# Patient Record
Sex: FEMALE | Race: WHITE | NOT HISPANIC OR LATINO | Employment: FULL TIME | URBAN - METROPOLITAN AREA
[De-identification: names, ages, dates, MRNs, and addresses within clinical notes are randomized per-mention and may not be internally consistent; named-entity substitution may affect disease eponyms.]

---

## 2017-02-23 ENCOUNTER — OFFICE VISIT (OUTPATIENT)
Dept: DERMATOLOGY | Facility: CLINIC | Age: 52
End: 2017-02-23

## 2017-02-23 DIAGNOSIS — L84 CORN OR CALLUS: ICD-10-CM

## 2017-02-23 DIAGNOSIS — L73.2 HIDRADENITIS SUPPURATIVA: Primary | ICD-10-CM

## 2017-02-23 RX ORDER — MINOCYCLINE HYDROCHLORIDE 100 MG/1
100 TABLET ORAL 2 TIMES DAILY
Qty: 180 TABLET | Refills: 0 | Status: CANCELLED | OUTPATIENT
Start: 2017-02-23

## 2017-02-23 RX ORDER — CLINDAMYCIN PHOSPHATE 10 UG/ML
LOTION TOPICAL
Qty: 60 ML | Refills: 3 | Status: SHIPPED | OUTPATIENT
Start: 2017-02-23 | End: 2017-05-25

## 2017-02-23 RX ORDER — MINOCYCLINE HYDROCHLORIDE 50 MG/1
50 CAPSULE ORAL 2 TIMES DAILY
Qty: 60 CAPSULE | Refills: 2 | Status: SHIPPED | OUTPATIENT
Start: 2017-02-23 | End: 2017-05-25

## 2017-02-23 ASSESSMENT — PAIN SCALES - GENERAL: PAINLEVEL: NO PAIN (0)

## 2017-02-23 NOTE — NURSING NOTE
Dermatology Rooming Note    Gertrude Dyson's goals for this visit include:   Chief Complaint   Patient presents with     Derm Problem     H S follow up, Gertrude states she is doing well today but has medication concerns.     Melinda Dawn LPN

## 2017-02-23 NOTE — PATIENT INSTRUCTIONS
Continue daily use of BP wash to the affected areas  Use clinda lotion twice daily to areas that are red/angry/inflammed.   Continue to attempt smoking cessation.   Sun protection as below.   Decrease dosage of antibiotic (minocycline) to 50 mg twice daily  Follow up in 3 months.

## 2017-02-23 NOTE — PROGRESS NOTES
"[facility] Dermatology Progress Note      Dermatology Problem List:  1. Hidradenitis Suppurativa, mild:  - S/p isotretinoin at outside clinic (elevated Tg and dry eyes). Presently: Minocycline 50 mg BID, BP was QD, Clindamycin lotion BID PRN. Goal of smoking cessation.   2. Hx of \"eczema\"'  3. Corn, right thumb. Paring 2/23/2017.     Encounter Date: Feb 23, 2017    CC:   Chief Complaint   Patient presents with     Derm Problem     H S follow up, Gertrude states she is doing well today but has medication concerns.       History of Present Illness:  Ms. Gertrude Dyson is a 51 year old woman who is seen in f/u for HS. Notably, she has had a great response to the treatment recommendations since last seen 10/13/16. She continues on the minocycline 100mg BID without SE or complications. Much fewer flares. Lesion injected on the left inframammary fold resolved quickly s/p ILK. Occasionally with inflammation in the right groin fold/drainage. 2 lesions slightly more linear/scarred on the left inframmamary fold. Overal, much improved, very pleased. Did not tolerate the drysol 2/2 sever skin irritation/burning. Using BP as prescribed. Hoping to quit smoking soon. Has purchased nicotine patches. Plans to do so in the near future.       Past Medical History:   Hidradenitis  DM2   HARPER  Tobacco use  Hx of opioid abuse  Headaches/Migraines  Glaucoma  Hypertriglyceridema  DJD  GERD  HTN  MD    Social History:  The patient works for [employer]. Worked the night shift prior to the appointment. .    Family History:  There is no family history of skin cancer.    Medications:  Current Outpatient Prescriptions   Medication Sig Dispense Refill     FERROUS GLUCONATE PO Take 324 mg by mouth Pt. Unsure of dose       minocycline (MINOCIN/DYNACIN) 50 MG capsule Take 1 capsule (50 mg) by mouth 2 times daily 60 capsule 2     clindamycin (CLEOCIN T) 1 % lotion Apply twice daily to sites of flaring on the body. 60 mL 3     " minocycline (DYNACIN) 100 MG tablet Take 1 tablet (100 mg) by mouth 2 times daily 180 tablet 0     METFORMIN HCL PO Take 500 mg by mouth 2 times daily (with meals)       sertraline (ZOLOFT) 50 MG tablet Take 50 mg by mouth daily       Pregabalin (LYRICA) 200 MG CAPS Take 1 capsule by mouth 3 times daily.       metoprolol (LOPRESSOR) 100 MG tablet Take 1 tablet by mouth daily.       SUMAtriptan (IMITREX) 50 MG tablet Take 1 tablet by mouth at onset of headache. May take 1 tab every 4 hours prn       methadone (DOLOPHINE) 5 MG tablet Take 1 tablet by mouth 3 times daily. Take about 8 hours apart       hydrochlorothiazide (HYDRODIURIL) 25 MG tablet Take 25 mg by mouth daily.       LANsoprazole (PREVACID SOLUTAB) 30 MG disintegrating tablet Take 30 mg by mouth daily.       zolpidem (AMBIEN) 10 MG tablet Take 10 mg by mouth nightly as needed.       aluminum chloride (DRYSOL) 20 % external solution Apply nightly to sweaty areas (breast, arm pits, groin folds) and wash area in morning. As sweating improves, decrease use to 1-2 times weekly. 60 mL 3     cyclobenzaprine (FLEXERIL) 10 MG tablet Take 1 tablet by mouth nightly as needed.       latanoprost (XALATAN) 0.005 % ophthalmic solution Place 1 drop into both eyes At Bedtime.       hydrOXYzine (VISTARIL) 25 MG capsule Take 1-2 capsules by mouth every 6 hours as needed. For nausea, anxiety, pain       aspirin 81 MG EC tablet Take 81 mg by mouth daily.       lisinopril (PRINIVIL,ZESTRIL) 40 MG tablet Take 40 mg by mouth daily.          No Known Allergies      Review of Systems:  -As per HPI  -Constitutional: The patient denies fatigue, fevers, chills, unintended weight loss, and night sweats.  -HEENT: Patient denies nonhealing oral sores.  -Skin: As above in HPI. No additional skin concerns.    Physical exam:  Vitals: There were no vitals taken for this visit.  GEN: This is a well developed, well-nourished female in no acute distress, in a pleasant mood.    SKIN: Focused  examination of the chest, abdomen, upper extremities, groin, thighs was performed.  -Double comedone in right axilla, small nodule underlying.   -Left inframammary fold with two, light pink, scar like oval papules. No active inflammatory lesions.   -Right inguinal fold with double comedone overlying small cystic nodule. No erythema or drainage.   -Right medial thumb with 2 mm, skin colored, shiny papule without obscuration of dermatoglyphs. No punctate vessels.   -No other lesions of concern on areas examined.     Impression/Plan:  1. Hidradenitis Suppurativa, Glass Stage 1, inframammary, groin and lower abdominal involvement. Isolated double comedone in right axilla. Previously s/p isotretinoin which was not well tolerated. Well controlled presently on Minocycline 100 mg BID as well as BP wash. Ongoing smoking likely contributing. No inflammatory lesions to inject today.   -Decrease Minocycline to 50 mg BID   -Add Clindamycin lotion BID to the affected inflammed lesions PRN.   -Continue daily BP wash to the affected areas  -Stop Drysol given previous irritation.    -Smoking cessation; presently starting nicotine patch.   -Discussed in detail etiology, pathophysiology, associated conditions, time course and treatment options. All questions answered to apparent satisfaction.     2. Callus/corn, right medial thumb. S/p paring today. Well tolerated. Will repeat PRN.     Follow-up in 3 months, earlier for new or changing lesions.      staffed the patient.     Staff Involved:  Resident(Nitin Jacobsen)/Staff(as above)    Nitin Jacobsen MD  PGY2 Dermatology  [phone]   I was present for the entire procedure. KB  .I, Agustina Hauser MD, saw this patient with the resident and agree with the resident s findings and plan of care as documented in the resident s note.

## 2017-02-23 NOTE — LETTER
"2/23/2017       RE: Gertrude Dyson  [address] [address]     Dear Colleague,    Thank you for referring your patient, Gertrude Dyson, to the [facility] DERMATOLOGY at [facility] Please see a copy of my visit note below.    [facility] Dermatology Progress Note      Dermatology Problem List:  1. Hidradenitis Suppurativa, mild:  - S/p isotretinoin at outside clinic (elevated Tg and dry eyes). Presently: Minocycline 50 mg BID, BP was QD, Clindamycin lotion BID PRN. Goal of smoking cessation.   2. Hx of \"eczema\"'  3. Corn, right thumb. Paring 2/23/2017.     Encounter Date: Feb 23, 2017    CC:   Chief Complaint   Patient presents with     Derm Problem     H S follow up, Gertrude states she is doing well today but has medication concerns.       History of Present Illness:  Ms. Gertrude Dyson is a 51 year old woman who is seen in f/u for HS. Notably, she has had a great response to the treatment recommendations since last seen 10/13/16. She continues on the minocycline 100mg BID without SE or complications. Much fewer flares. Lesion injected on the left inframammary fold resolved quickly s/p ILK. Occasionally with inflammation in the right groin fold/drainage. 2 lesions slightly more linear/scarred on the left inframmamary fold. Overal, much improved, very pleased. Did not tolerate the drysol 2/2 sever skin irritation/burning. Using BP as prescribed. Hoping to quit smoking soon. Has purchased nicotine patches. Plans to do so in the near future.       Past Medical History:   Hidradenitis  DM2   HARPER  Tobacco use  Hx of opioid abuse  Headaches/Migraines  Glaucoma  Hypertriglyceridema  DJD  GERD  HTN  MD    Social History:  The patient works for [employer]. Worked the night shift prior to the appointment. .    Family History:  There is no family history of skin cancer.    Medications:  Current Outpatient Prescriptions   Medication Sig Dispense Refill "     FERROUS GLUCONATE PO Take 324 mg by mouth Pt. Unsure of dose       minocycline (MINOCIN/DYNACIN) 50 MG capsule Take 1 capsule (50 mg) by mouth 2 times daily 60 capsule 2     clindamycin (CLEOCIN T) 1 % lotion Apply twice daily to sites of flaring on the body. 60 mL 3     minocycline (DYNACIN) 100 MG tablet Take 1 tablet (100 mg) by mouth 2 times daily 180 tablet 0     METFORMIN HCL PO Take 500 mg by mouth 2 times daily (with meals)       sertraline (ZOLOFT) 50 MG tablet Take 50 mg by mouth daily       Pregabalin (LYRICA) 200 MG CAPS Take 1 capsule by mouth 3 times daily.       metoprolol (LOPRESSOR) 100 MG tablet Take 1 tablet by mouth daily.       SUMAtriptan (IMITREX) 50 MG tablet Take 1 tablet by mouth at onset of headache. May take 1 tab every 4 hours prn       methadone (DOLOPHINE) 5 MG tablet Take 1 tablet by mouth 3 times daily. Take about 8 hours apart       hydrochlorothiazide (HYDRODIURIL) 25 MG tablet Take 25 mg by mouth daily.       LANsoprazole (PREVACID SOLUTAB) 30 MG disintegrating tablet Take 30 mg by mouth daily.       zolpidem (AMBIEN) 10 MG tablet Take 10 mg by mouth nightly as needed.       aluminum chloride (DRYSOL) 20 % external solution Apply nightly to sweaty areas (breast, arm pits, groin folds) and wash area in morning. As sweating improves, decrease use to 1-2 times weekly. 60 mL 3     cyclobenzaprine (FLEXERIL) 10 MG tablet Take 1 tablet by mouth nightly as needed.       latanoprost (XALATAN) 0.005 % ophthalmic solution Place 1 drop into both eyes At Bedtime.       hydrOXYzine (VISTARIL) 25 MG capsule Take 1-2 capsules by mouth every 6 hours as needed. For nausea, anxiety, pain       aspirin 81 MG EC tablet Take 81 mg by mouth daily.       lisinopril (PRINIVIL,ZESTRIL) 40 MG tablet Take 40 mg by mouth daily.          No Known Allergies      Review of Systems:  -As per HPI  -Constitutional: The patient denies fatigue, fevers, chills, unintended weight loss, and night sweats.  -HEENT:  Patient denies nonhealing oral sores.  -Skin: As above in HPI. No additional skin concerns.    Physical exam:  Vitals: There were no vitals taken for this visit.  GEN: This is a well developed, well-nourished female in no acute distress, in a pleasant mood.    SKIN: Focused examination of the chest, abdomen, upper extremities, groin, thighs was performed.  -Double comedone in right axilla, small nodule underlying.   -Left inframammary fold with two, light pink, scar like oval papules. No active inflammatory lesions.   -Right inguinal fold with double comedone overlying small cystic nodule. No erythema or drainage.   -Right medial thumb with 2 mm, skin colored, shiny papule without obscuration of dermatoglyphs. No punctate vessels.   -No other lesions of concern on areas examined.     Impression/Plan:  1. Hidradenitis Suppurativa, Glass Stage 1, inframammary, groin and lower abdominal involvement. Isolated double comedone in right axilla. Previously s/p isotretinoin which was not well tolerated. Well controlled presently on Minocycline 100 mg BID as well as BP wash. Ongoing smoking likely contributing. No inflammatory lesions to inject today.   -Decrease Minocycline to 50 mg BID   -Add Clindamycin lotion BID to the affected inflammed lesions PRN.   -Continue daily BP wash to the affected areas  -Stop Drysol given previous irritation.    -Smoking cessation; presently starting nicotine patch.   -Discussed in detail etiology, pathophysiology, associated conditions, time course and treatment options. All questions answered to apparent satisfaction.     2. Callus/corn, right medial thumb. S/p paring today. Well tolerated. Will repeat PRN.     Follow-up in 3 months, earlier for new or changing lesions.      staffed the patient.     Staff Involved:  Resident(Nitin Jacobsen)/Staff(as above)    Nitin Jacobsen MD  PGY2 Dermatology  [phone]   I was present for the entire procedure. MEJIA MANN, Agustina Hauser  MD, saw this patient with the resident and agree with the resident s findings and plan of care as documented in the resident s note.

## 2017-02-23 NOTE — MR AVS SNAPSHOT
After Visit Summary   2/23/2017    Gertrude Dyson    MRN: [id number]           Patient Information     Date Of Birth          1965        Visit Information        Provider Department      2/23/2017 10:30 AM Nitin Jacobsen MD [facility] Dermatology        Today's Diagnoses     Hidradenitis suppurativa    -  1    Corn or callus          Care Instructions    Continue daily use of BP wash to the affected areas  Use clinda lotion twice daily to areas that are red/angry/inflammed.   Continue to attempt smoking cessation.   Sun protection as below.   Decrease dosage of antibiotic (minocycline) to 50 mg twice daily  Follow up in 3 months.                     Follow-ups after your visit        Follow-up notes from your care team     Return in about 3 months (around 5/23/2017).      Your next 10 appointments already scheduled     May 25, 2017  9:00 AM CDT   (Arrive by 8:45 AM)   Return Visit with Nitin Jacobsen MD   [facility] Dermatology ([facility])    [address] [place] [id number] [phone]              Who to contact     Please call your clinic at [phone] to:    Ask questions about your health    Make or cancel appointments    Discuss your medicines    Learn about your test results    Speak to your doctor   If you have compliments or concerns about an experience at your clinic, or if you wish to file a complaint, please contact [facility] Patient Relations at [phone] or email us at [email or web address]         Additional Information About Your Visit        MyChart Information     [employer] is an electronic gateway that provides easy, online access to your medical records. With [employer], you can request a clinic appointment, read your test results, renew a prescription or communicate with your care team.     To sign up for [employer] visit the website at [email or web address]   You  will be asked to enter the access code listed below, as well as some personal information. Please follow the directions to create your username and password.     Your access code is: [email or web address]  Expires: 2017  6:30 AM     Your access code will  in 90 days. If you need help or a new code, please contact your [facility] or call [phone] for assistance.        Care EveryWhere ID     This is your Care EveryWhere ID. This could be used by other organizations to access your [facility] medical records  [id number]         Blood Pressure from Last 3 Encounters:   14 110/70   14 110/63   13 110/70    Weight from Last 3 Encounters:   14 72.6 kg (160 lb)   14 73.9 kg (163 lb)   13 75 kg (165 lb 6.4 oz)              We Performed the Following     DESTRUCT BENIGN LESION, UP TO 14          Today's Medication Changes          These changes are accurate as of: 17 10:58 AM.  If you have any questions, ask your nurse or doctor.               Start taking these medicines.        Dose/Directions    clindamycin 1 % lotion   Commonly known as:  CLEOCIN T   Used for:  Hidradenitis suppurativa        Apply twice daily to sites of flaring on the body.   Quantity:  60 mL   Refills:  3         These medicines have changed or have updated prescriptions.        Dose/Directions    * minocycline 100 MG tablet   Commonly known as:  DYNACIN   This may have changed:  Another medication with the same name was added. Make sure you understand how and when to take each.   Used for:  Hidradenitis suppurativa        Dose:  100 mg   Take 1 tablet (100 mg) by mouth 2 times daily   Quantity:  180 tablet   Refills:  0       * minocycline 50 MG capsule   Commonly known as:  MINOCIN/DYNACIN   This may have changed:  You were already taking a medication with the same name, and this prescription was added. Make sure you understand how and when to take each.   Used for:   Hidradenitis suppurativa        Dose:  50 mg   Take 1 capsule (50 mg) by mouth 2 times daily   Quantity:  60 capsule   Refills:  2       * Notice:  This list has 2 medication(s) that are the same as other medications prescribed for you. Read the directions carefully, and ask your doctor or other care provider to review them with you.         Where to get your medicines      These medications were sent to [place] [address] [place] [address] [address]     Phone:  [phone]     clindamycin 1 % lotion    minocycline 50 MG capsule                Primary Care Provider    Lisa Lam [id number]       [place]  [place] [address]        Thank you!     Thank you for choosing [facility] DERMATOLOGY  for your care. Our goal is always to provide you with excellent care. Hearing back from our patients is one way we can continue to improve our services. Please take a few minutes to complete the written survey that you may receive in the mail after your visit with us. Thank you!             Your Updated Medication List - Protect others around you: Learn how to safely use, store and throw away your medicines at www.disposemymeds.org.          This list is accurate as of: 2/23/17 10:58 AM.  Always use your most recent med list.                   Brand Name Dispense Instructions for use    aluminum chloride 20 % external solution    DRYSOL    60 mL    Apply nightly to sweaty areas (breast, arm pits, groin folds) and wash area in morning. As sweating improves, decrease use to 1-2 times weekly.       aspirin 81 MG EC tablet      Take 81 mg by mouth daily.       clindamycin 1 % lotion    CLEOCIN T    60 mL    Apply twice daily to sites of flaring on the body.       cyclobenzaprine 10 MG tablet    FLEXERIL     Take 1 tablet by mouth nightly as needed.       FERROUS GLUCONATE PO      Take 324 mg by mouth Pt. Unsure of dose       hydrochlorothiazide 25 MG tablet    HYDRODIURIL      Take 25 mg by mouth daily.       latanoprost 0.005 % ophthalmic solution    XALATAN     Place 1 drop into both eyes At Bedtime.       lisinopril 40 MG tablet    PRINIVIL/ZESTRIL     Take 40 mg by mouth daily.       LYRICA 200 MG capsule   Generic drug:  Pregabalin      Take 1 capsule by mouth 3 times daily.       METFORMIN HCL PO      Take 500 mg by mouth 2 times daily (with meals)       methadone 5 MG tablet    DOLOPHINE     Take 1 tablet by mouth 3 times daily. Take about 8 hours apart       metoprolol 100 MG tablet    LOPRESSOR     Take 1 tablet by mouth daily.       * minocycline 100 MG tablet    DYNACIN    180 tablet    Take 1 tablet (100 mg) by mouth 2 times daily       * minocycline 50 MG capsule    MINOCIN/DYNACIN    60 capsule    Take 1 capsule (50 mg) by mouth 2 times daily       PREVACID SOLUTAB 30 MG ODT tab   Generic drug:  LANsoprazole      Take 30 mg by mouth daily.       sertraline 50 MG tablet    ZOLOFT     Take 50 mg by mouth daily       SUMAtriptan 50 MG tablet    IMITREX     Take 1 tablet by mouth at onset of headache. May take 1 tab every 4 hours prn       VISTARIL 25 MG capsule   Generic drug:  hydrOXYzine      Take 1-2 capsules by mouth every 6 hours as needed. For nausea, anxiety, pain       zolpidem 10 MG tablet    AMBIEN     Take 10 mg by mouth nightly as needed.       * Notice:  This list has 2 medication(s) that are the same as other medications prescribed for you. Read the directions carefully, and ask your doctor or other care provider to review them with you.

## 2017-02-27 ENCOUNTER — TRANSFERRED RECORDS (OUTPATIENT)
Dept: HEALTH INFORMATION MANAGEMENT | Facility: CLINIC | Age: 52
End: 2017-02-27

## 2017-05-25 ENCOUNTER — OFFICE VISIT (OUTPATIENT)
Dept: DERMATOLOGY | Facility: CLINIC | Age: 52
End: 2017-05-25

## 2017-05-25 DIAGNOSIS — L73.2 HIDRADENITIS SUPPURATIVA: ICD-10-CM

## 2017-05-25 DIAGNOSIS — L73.2 HIDRADENITIS SUPPURATIVA: Primary | ICD-10-CM

## 2017-05-25 RX ORDER — MINOCYCLINE HYDROCHLORIDE 50 MG/1
50 CAPSULE ORAL 2 TIMES DAILY
Qty: 60 CAPSULE | Refills: 5 | Status: SHIPPED | OUTPATIENT
Start: 2017-05-25 | End: 2017-05-25

## 2017-05-25 RX ORDER — MINOCYCLINE HYDROCHLORIDE 50 MG/1
50 CAPSULE ORAL DAILY
Qty: 60 CAPSULE | Refills: 5 | Status: SHIPPED | OUTPATIENT
Start: 2017-05-25 | End: 2019-05-09 | Stop reason: ALTCHOICE

## 2017-05-25 RX ORDER — CLINDAMYCIN PHOSPHATE 10 UG/ML
LOTION TOPICAL
Qty: 60 ML | Refills: 3 | Status: SHIPPED | OUTPATIENT
Start: 2017-05-25 | End: 2019-05-09

## 2017-05-25 ASSESSMENT — PAIN SCALES - GENERAL: PAINLEVEL: NO PAIN (0)

## 2017-05-25 NOTE — LETTER
"5/25/2017     RE: Gertrude Dyson  [address] [facility] [address]     Dear Colleague,    Thank you for referring your patient, Gertrude Dyson, to the [facility] DERMATOLOGY at [facility]. Please see a copy of my visit note below.    [facility] Dermatology Progress Note      Dermatology Problem List:  1. Hidradenitis Suppurativa, mild:  - S/p isotretinoin at outside clinic (elevated Tg and dry eyes). Presently: Minocycline 50 mg BID, BP was QD, Clindamycin lotion BID PRN. Goal of smoking cessation.   2. Hx of \"eczema\"'  3. Corn, right thumb. Paring 2/23/2017.     Encounter Date: May 25, 2017    CC:   Chief Complaint   Patient presents with     Derm Problem     Gertrude comes to clinic today for HS. States \"I had a new one on my rib cage but that was the only one.\"       History of Present Illness:  Ms. Gertrude Dyson is a 51 year old woman who is seen in f/u for HS. Notably, she has had great controll since she was seen   3 mo ago. Weaned down on the Minocycline to 100mg daily without any concerns. Tolerating well without any SE.  She takes this once daily instead of 50 BID given concern for use with her vitamins/supplements. She has had one inflammatory lesion under her left breast that lasted for a couple days on 5/2 when she was traveling to [place]. Otherwise flare free without any other skin concerns.Seeing ophtho for dry eyes; encouraged ongoing minocycline use. No concerns with dyspigmentation. Has patches and is trying to quit smoking; encouraging.     Past Medical History:   Hidradenitis  DM2   HARPER  Tobacco use  Hx of opioid abuse  Headaches/Migraines  Glaucoma  Hypertriglyceridema  DJD  GERD  HTN  MD    Social History:  The patient works for [employer]. Worked the night shift prior to the appointment. .    Family History:  There is no family history of skin cancer.    Medications:  Current Outpatient Prescriptions   Medication Sig Dispense Refill "     FERROUS GLUCONATE PO Take 324 mg by mouth Pt. Unsure of dose       minocycline (MINOCIN/DYNACIN) 50 MG capsule Take 1 capsule (50 mg) by mouth 2 times daily 60 capsule 2     clindamycin (CLEOCIN T) 1 % lotion Apply twice daily to sites of flaring on the body. 60 mL 3     aluminum chloride (DRYSOL) 20 % external solution Apply nightly to sweaty areas (breast, arm pits, groin folds) and wash area in morning. As sweating improves, decrease use to 1-2 times weekly. 60 mL 3     minocycline (DYNACIN) 100 MG tablet Take 1 tablet (100 mg) by mouth 2 times daily 180 tablet 0     METFORMIN HCL PO Take 500 mg by mouth 2 times daily (with meals)       sertraline (ZOLOFT) 50 MG tablet Take 50 mg by mouth daily       Pregabalin (LYRICA) 200 MG CAPS Take 1 capsule by mouth 3 times daily.       metoprolol (LOPRESSOR) 100 MG tablet Take 1 tablet by mouth daily.       cyclobenzaprine (FLEXERIL) 10 MG tablet Take 1 tablet by mouth nightly as needed.       SUMAtriptan (IMITREX) 50 MG tablet Take 1 tablet by mouth at onset of headache. May take 1 tab every 4 hours prn       latanoprost (XALATAN) 0.005 % ophthalmic solution Place 1 drop into both eyes At Bedtime.       hydrOXYzine (VISTARIL) 25 MG capsule Take 1-2 capsules by mouth every 6 hours as needed. For nausea, anxiety, pain       methadone (DOLOPHINE) 5 MG tablet Take 1 tablet by mouth 3 times daily. Take about 8 hours apart       hydrochlorothiazide (HYDRODIURIL) 25 MG tablet Take 25 mg by mouth daily.       aspirin 81 MG EC tablet Take 81 mg by mouth daily.       LANsoprazole (PREVACID SOLUTAB) 30 MG disintegrating tablet Take 30 mg by mouth daily.       lisinopril (PRINIVIL,ZESTRIL) 40 MG tablet Take 40 mg by mouth daily.       zolpidem (AMBIEN) 10 MG tablet Take 10 mg by mouth nightly as needed.          No Known Allergies      Review of Systems:  -As per HPI  -Constitutional: The patient denies fatigue, fevers, chills, unintended weight loss, and night sweats.  -HEENT:  Patient denies nonhealing oral sores.  -Skin: As above in HPI. No additional skin concerns.    Physical exam:  Vitals: There were no vitals taken for this visit.  GEN: This is a well developed, well-nourished female in no acute distress, in a pleasant mood.    SKIN: Focused examination of the chest, abdomen, upper extremities, groin, thighs was performed.  -small grey post-inflammatory hyperpigmented patch on the left inframammary fold. No active lesions, nodules or cyst.   -Axillae and groin are clear.   -No dyschromia noted.   -No other lesions of concern on areas examined.     Impression/Plan:  1. Hidradenitis Suppurativa, Glass Stage 1, inframammary, groin and lower abdominal involvement. Clear today. Quite pleased; reassuring. Previously s/p isotretinoin which was not well tolerated. Discussed in detail etiology, pathophysiology, associated conditions, time course and treatment options. All questions answered to apparent satisfaction.   -Decrease Minocycline to 50 mg daily (increase to 100mg if flaring on this taper).   -Continue Clindamycin lotion BID to the affected inflammed lesions PRN.   -Continue daily BP wash to the affected areas   -Smoking cessation; presently starting nicotine patch.     Follow-up in 6 months, earlier for new or changing lesions.      staffed the patient.     Staff Involved:  Resident(Nitin Jacobsen)/Staff(as above)    Nitin Jacobsen MD  PGY2 Dermatology  [phone]

## 2017-05-25 NOTE — TELEPHONE ENCOUNTER
Next Visit: None    LAst Visit: 5/25/17    Impression/Plan:  1. Hidradenitis Suppurativa, Glass Stage 1, inframammary, groin and lower abdominal involvement. Clear today. Quite pleased; reassuring. Previously s/p isotretinoin which was not well tolerated. Discussed in detail etiology, pathophysiology, associated conditions, time course and treatment options. All questions answered to apparent satisfaction.   -Decrease Minocycline to 50 mg daily (increase to 100mg if flaring on this taper).   -Continue Clindamycin lotion BID to the affected inflammed lesions PRN.   -Continue daily BP wash to the affected areas   -Smoking cessation; presently starting nicotine patch.

## 2017-05-25 NOTE — PROGRESS NOTES
"[facility] Dermatology Progress Note      Dermatology Problem List:  1. Hidradenitis Suppurativa, mild:  - S/p isotretinoin at outside clinic (elevated Tg and dry eyes). Presently: Minocycline 50 mg BID, BP was QD, Clindamycin lotion BID PRN. Goal of smoking cessation.   2. Hx of \"eczema\"'  3. Corn, right thumb. Paring 2/23/2017.     Encounter Date: May 25, 2017    CC:   Chief Complaint   Patient presents with     Derm Problem     Gertrude comes to clinic today for HS. States \"I had a new one on my rib cage but that was the only one.\"       History of Present Illness:  Ms. Gertrude Dyson is a 51 year old woman who is seen in f/u for HS. Notably, she has had great controll since she was seen   3 mo ago. Weaned down on the Minocycline to 100mg daily without any concerns. Tolerating well without any SE.  She takes this once daily instead of 50 BID given concern for use with her vitamins/supplements. She has had one inflammatory lesion under her left breast that lasted for a couple days on 5/2 when she was traveling to [place]. Otherwise flare free without any other skin concerns.Seeing ophtho for dry eyes; encouraged ongoing minocycline use. No concerns with dyspigmentation. Has patches and is trying to quit smoking; encouraging.     Past Medical History:   Hidradenitis  DM2   HARPER  Tobacco use  Hx of opioid abuse  Headaches/Migraines  Glaucoma  Hypertriglyceridema  DJD  GERD  HTN  MD    Social History:  The patient works for [employer]. Worked the night shift prior to the appointment. .    Family History:  There is no family history of skin cancer.    Medications:  Current Outpatient Prescriptions   Medication Sig Dispense Refill     FERROUS GLUCONATE PO Take 324 mg by mouth Pt. Unsure of dose       minocycline (MINOCIN/DYNACIN) 50 MG capsule Take 1 capsule (50 mg) by mouth 2 times daily 60 capsule 2     clindamycin (CLEOCIN T) 1 % lotion Apply twice daily to sites of flaring on the body. 60 mL 3     " aluminum chloride (DRYSOL) 20 % external solution Apply nightly to sweaty areas (breast, arm pits, groin folds) and wash area in morning. As sweating improves, decrease use to 1-2 times weekly. 60 mL 3     minocycline (DYNACIN) 100 MG tablet Take 1 tablet (100 mg) by mouth 2 times daily 180 tablet 0     METFORMIN HCL PO Take 500 mg by mouth 2 times daily (with meals)       sertraline (ZOLOFT) 50 MG tablet Take 50 mg by mouth daily       Pregabalin (LYRICA) 200 MG CAPS Take 1 capsule by mouth 3 times daily.       metoprolol (LOPRESSOR) 100 MG tablet Take 1 tablet by mouth daily.       cyclobenzaprine (FLEXERIL) 10 MG tablet Take 1 tablet by mouth nightly as needed.       SUMAtriptan (IMITREX) 50 MG tablet Take 1 tablet by mouth at onset of headache. May take 1 tab every 4 hours prn       latanoprost (XALATAN) 0.005 % ophthalmic solution Place 1 drop into both eyes At Bedtime.       hydrOXYzine (VISTARIL) 25 MG capsule Take 1-2 capsules by mouth every 6 hours as needed. For nausea, anxiety, pain       methadone (DOLOPHINE) 5 MG tablet Take 1 tablet by mouth 3 times daily. Take about 8 hours apart       hydrochlorothiazide (HYDRODIURIL) 25 MG tablet Take 25 mg by mouth daily.       aspirin 81 MG EC tablet Take 81 mg by mouth daily.       LANsoprazole (PREVACID SOLUTAB) 30 MG disintegrating tablet Take 30 mg by mouth daily.       lisinopril (PRINIVIL,ZESTRIL) 40 MG tablet Take 40 mg by mouth daily.       zolpidem (AMBIEN) 10 MG tablet Take 10 mg by mouth nightly as needed.          No Known Allergies      Review of Systems:  -As per HPI  -Constitutional: The patient denies fatigue, fevers, chills, unintended weight loss, and night sweats.  -HEENT: Patient denies nonhealing oral sores.  -Skin: As above in HPI. No additional skin concerns.    Physical exam:  Vitals: There were no vitals taken for this visit.  GEN: This is a well developed, well-nourished female in no acute distress, in a pleasant mood.    SKIN: Focused  examination of the chest, abdomen, upper extremities, groin, thighs was performed.  -small grey post-inflammatory hyperpigmented patch on the left inframammary fold. No active lesions, nodules or cyst.   -Axillae and groin are clear.   -No dyschromia noted.   -No other lesions of concern on areas examined.     Impression/Plan:  1. Hidradenitis Suppurativa, Glass Stage 1, inframammary, groin and lower abdominal involvement. Clear today. Quite pleased; reassuring. Previously s/p isotretinoin which was not well tolerated. Discussed in detail etiology, pathophysiology, associated conditions, time course and treatment options. All questions answered to apparent satisfaction.   -Decrease Minocycline to 50 mg daily (increase to 100mg if flaring on this taper).   -Continue Clindamycin lotion BID to the affected inflammed lesions PRN.   -Continue daily BP wash to the affected areas   -Smoking cessation; presently starting nicotine patch.     Follow-up in 6 months, earlier for new or changing lesions.     Dr. Parker staffed the patient.     Staff Involved:  Resident(Nitin Jacobsen)/Staff(as above)    Nitin Jacobsen MD  PGY2 Dermatology  [phone]

## 2017-05-25 NOTE — PATIENT INSTRUCTIONS
-Continue on the antibiotic. Think about decreasing Minocycline to 50mg once daily.   Okay to increase back to 100 mg if flaring.   -Continue with BP wash and the clinda lotion.   -Keep working on tobacco cessation.

## 2017-05-25 NOTE — NURSING NOTE
"Dermatology Rooming Note    Gertrude Dyson's goals for this visit include:   Chief Complaint   Patient presents with     Derm Problem     Gertrude comes to clinic today for HS. States \"I had a new one on my rib cage but that was the only one.\"     Juanita Garcia, [facility]    "

## 2017-05-25 NOTE — MR AVS SNAPSHOT
After Visit Summary   5/25/2017    Gertrude Dyson    MRN: [id number]           Patient Information     Date Of Birth          1965        Visit Information        Provider Department      5/25/2017 9:00 AM Nitin Jacobsen MD [facility] Dermatology        Today's Diagnoses     Hidradenitis suppurativa    -  1      Care Instructions    -Continue on the antibiotic. Think about decreasing Minocycline to 50mg once daily.   Okay to increase back to 100 mg if flaring.   -Continue with BP wash and the clinda lotion.   -Keep working on tobacco cessation.           Follow-ups after your visit        Follow-up notes from your care team     Return in about 6 months (around 11/25/2017).      Who to contact     Please call your clinic at [phone] to:    Ask questions about your health    Make or cancel appointments    Discuss your medicines    Learn about your test results    Speak to your doctor   If you have compliments or concerns about an experience at your clinic, or if you wish to file a complaint, please contact [facility] Patient Relations at [phone] or email us at [email or web address]         Additional Information About Your Visit        MyChart Information     [employer]ely gives you secure access to your electronic health record. If you see a primary care provider, you can also send messages to your care team and make appointments. If you have questions, please call your primary care clinic.  If you do not have a primary care provider, please call [phone] and they will assist you.      Jukely is an electronic gateway that provides easy, online access to your medical records. With [employer], you can request a clinic appointment, read your test results, renew a prescription or communicate with your care team.     To access your existing account, please contact your [facility] or call [phone] for assistance.         Care EveryWhere ID     This is your Care EveryWhere ID. This could be used by other organizations to access your [place] medical records  [id number]         Blood Pressure from Last 3 Encounters:   07/07/14 110/70   01/06/14 110/63   09/16/13 110/70    Weight from Last 3 Encounters:   07/07/14 72.6 kg (160 lb)   01/06/14 73.9 kg (163 lb)   09/16/13 75 kg (165 lb 6.4 oz)              Today, you had the following     No orders found for display         Where to get your medicines      These medications were sent to [facility] [id number] [place] [address]  [address] [address]     Phone:  [phone]     clindamycin 1 % lotion    minocycline 50 MG capsule          Primary Care Provider    Lisa Lam [id number]       [place]  [place] [address]        Thank you!     Thank you for choosing [facility] DERMATOLOGY  for your care. Our goal is always to provide you with excellent care. Hearing back from our patients is one way we can continue to improve our services. Please take a few minutes to complete the written survey that you may receive in the mail after your visit with us. Thank you!             Your Updated Medication List - Protect others around you: Learn how to safely use, store and throw away your medicines at www.disposemymeds.org.          This list is accurate as of: 5/25/17  9:08 AM.  Always use your most recent med list.                   Brand Name Dispense Instructions for use    aluminum chloride 20 % external solution    DRYSOL    60 mL    Apply nightly to sweaty areas (breast, arm pits, groin folds) and wash area in morning. As sweating improves, decrease use to 1-2 times weekly.       aspirin 81 MG EC tablet      Take 81 mg by mouth daily.       clindamycin 1 % lotion    CLEOCIN T    60 mL    Apply twice daily to sites of flaring on the body.       cyclobenzaprine 10 MG tablet    FLEXERIL     Take 1 tablet by mouth nightly as needed.       FERROUS  GLUCONATE PO      Take 324 mg by mouth Pt. Unsure of dose       hydrochlorothiazide 25 MG tablet    HYDRODIURIL     Take 25 mg by mouth daily.       latanoprost 0.005 % ophthalmic solution    XALATAN     Place 1 drop into both eyes At Bedtime.       lisinopril 40 MG tablet    PRINIVIL/ZESTRIL     Take 40 mg by mouth daily.       LYRICA 200 MG capsule   Generic drug:  Pregabalin      Take 1 capsule by mouth 3 times daily.       METFORMIN HCL PO      Take 500 mg by mouth 2 times daily (with meals)       methadone 5 MG tablet    DOLOPHINE     Take 1 tablet by mouth 3 times daily. Take about 8 hours apart       metoprolol 100 MG tablet    LOPRESSOR     Take 1 tablet by mouth daily.       * minocycline 100 MG tablet    DYNACIN    180 tablet    Take 1 tablet (100 mg) by mouth 2 times daily       * minocycline 50 MG capsule    MINOCIN/DYNACIN    60 capsule    Take 1 capsule (50 mg) by mouth 2 times daily       PREVACID SOLUTAB 30 MG ODT tab   Generic drug:  LANsoprazole      Take 30 mg by mouth daily.       sertraline 50 MG tablet    ZOLOFT     Take 50 mg by mouth daily       SUMAtriptan 50 MG tablet    IMITREX     Take 1 tablet by mouth at onset of headache. May take 1 tab every 4 hours prn       VISTARIL 25 MG capsule   Generic drug:  hydrOXYzine      Take 1-2 capsules by mouth every 6 hours as needed. For nausea, anxiety, pain       zolpidem 10 MG tablet    AMBIEN     Take 10 mg by mouth nightly as needed.       * Notice:  This list has 2 medication(s) that are the same as other medications prescribed for you. Read the directions carefully, and ask your doctor or other care provider to review them with you.

## 2017-10-29 ENCOUNTER — HEALTH MAINTENANCE LETTER (OUTPATIENT)
Age: 52
End: 2017-10-29

## 2017-11-09 ENCOUNTER — OFFICE VISIT (OUTPATIENT)
Dept: DERMATOLOGY | Facility: CLINIC | Age: 52
End: 2017-11-09

## 2017-11-09 DIAGNOSIS — L73.2 HIDRADENITIS SUPPURATIVA: Primary | ICD-10-CM

## 2017-11-09 DIAGNOSIS — L81.1 MELASMA: ICD-10-CM

## 2017-11-09 DIAGNOSIS — I83.90 VARICOSITIES OF LEG: ICD-10-CM

## 2017-11-09 RX ORDER — MINOCYCLINE HYDROCHLORIDE 50 MG/1
100 CAPSULE ORAL DAILY
Qty: 60 CAPSULE | Refills: 5 | Status: SHIPPED | OUTPATIENT
Start: 2017-11-09 | End: 2018-05-19

## 2017-11-09 ASSESSMENT — PAIN SCALES - GENERAL: PAINLEVEL: NO PAIN (0)

## 2017-11-09 NOTE — NURSING NOTE
"Dermatology Rooming Note    Gertrude Dyson's goals for this visit include:   Chief Complaint   Patient presents with     Derm Problem     H S, Gertrude states \" I have one that is really bad and it drains .\"     Melinda Dawn,ENIO  "

## 2017-11-09 NOTE — PROGRESS NOTES
[facility] Dermatology Note      Dermatology Problem List:   1.  Hidradenitis suppurativa, mild:     - SP isotretinoin at outside clinic (elevated triglycerides and dry eyes).  Presently minocycline 50 mg b.i.d., BP wash versus Hibiclens, clindamycin lotion b.i.d. p.r.n.  Recent smoking cessation.   2.  History of eczema.   3.  Corn, right thumb, status post paring 02/23/2017.   4.  Melasma, face.  Sun protection and Tri-Aissatou.      Encounter Date:  11/09/2017      CC:  Follow up hidradenitis.      History of Present Illness:   Gertrude Dyson is a pleasant, 52-year-old woman who is seen today in routine dermatologic followup for hidradenitis suppurativa.  She was last seen in routine followup on 05/25/2017.  Since that time, the patient notes that has stopped smoking.  Her last cigarette was on 07/03/2017, and she has done well off of this.  Recent changes in her condition include intermittent bleeding stomach ulcers, which she receives appropriate care for, and a recent tonsillectomy/adenoidectomy that she has healed up from.  The patient notes that she tried to decrease down to 50 mg daily of the minocycline from 100 mg previously and unfortunately has been having significant flares every couple of weeks.  Self-titrated back up to 100 mg daily, which has been controlling things better for her.  She continues to use the clindamycin lotion twice daily.  Affected area has been on the left breast, but otherwise it has been clear elsewhere.  She has not been using the benzoyl peroxide wash as of recent given bleaching side effects overlying her bra.  No other major concerns today.  She would like to discuss melasma treatment.  Denies significant sun-protective measures or topical treatments to date.      PAST MEDICAL HISTORY: No past medical history on file.    PAST SURGICAL HISTORY: No past surgical history on file.    FAMILY HISTORY: No family history on file.    SOCIAL HISTORY:   Social History    Substance Use Topics     Smoking status: Current Every Day Smoker     Smokeless tobacco: Not on file     Alcohol use Not on file       Current Outpatient Prescriptions   Medication     minocycline (MINOCIN/DYNACIN) 50 MG capsule     chlorhexidine (HIBICLENS) 4 % liquid     fluocin-hydroquinone-tretinoin 0.01-4-0.05 % CREA     clindamycin (CLEOCIN T) 1 % lotion     minocycline (MINOCIN/DYNACIN) 50 MG capsule     FERROUS GLUCONATE PO     aluminum chloride (DRYSOL) 20 % external solution     minocycline (DYNACIN) 100 MG tablet     METFORMIN HCL PO     sertraline (ZOLOFT) 50 MG tablet     Pregabalin (LYRICA) 200 MG CAPS     metoprolol (LOPRESSOR) 100 MG tablet     cyclobenzaprine (FLEXERIL) 10 MG tablet     SUMAtriptan (IMITREX) 50 MG tablet     latanoprost (XALATAN) 0.005 % ophthalmic solution     hydrOXYzine (VISTARIL) 25 MG capsule     methadone (DOLOPHINE) 5 MG tablet     hydrochlorothiazide (HYDRODIURIL) 25 MG tablet     aspirin 81 MG EC tablet     LANsoprazole (PREVACID SOLUTAB) 30 MG disintegrating tablet     lisinopril (PRINIVIL,ZESTRIL) 40 MG tablet     zolpidem (AMBIEN) 10 MG tablet     No current facility-administered medications for this visit.      No Known Allergies       Review of Systems:   No fevers, chills or sweats.  No side effects of minocycline, including GI, photosensitivity or rheumatologic.  Skin per HPI.         Physical exam:   GENERAL:  A well-appearing woman who appears stated age.  Affect normal.     SKIN:  A limited skin examination today is performed, including the face, neck, chest, abdomen, axillae and bilateral arms.  The patient has a somewhat flaccid, post inflammatory, hyperpigmented, scar-like, 1 cm patch under the left breast with a small, scar-like, linear plaque just adjacent.  No signs of active inflammation or superinfection.  There is no drainage today.  No cystic or sinus tract lesions appreciated.  Axillae and groin are clear today.  There is no dyschromia noted,  including evaluation of the hard palate.  The patient has well-demarcated, medium-brown patches consistent with melasma over the sun-exposed areas of the face, right greater than left, particularly on the right upper forehead.  No other concerning findings on examination today.       Impression/Plan:   1.  Hidradenitis suppurativa, Glass stage 1.  Inframammary, groin and lower abdominal involvement previously.  One recurrent lesion in the left inframammary fold as of recent.  Unfortunately, she did not tolerate down-titration of minocycline 50 mg daily.  Again discussed in detail the etiology, pathophysiology, associated conditions as well as time, course and treatment options.  All questions were answered to her apparent satisfaction.     - We will continue minocycline at 100 mg daily.  (Failing 50 mg daily previously) Side effect profile of minocycline was reviewed with her today in detail.  All questions were answered to her apparent satisfaction.     - Continue clindamycin lotion b.i.d. to affected sites/body areas.   - In place of benzoyl peroxide, we will provide a prescription for Hibiclens wash that she can use to the affected areas 2-3 times weekly.   - Gertrude has recently undergone smoking cessation.  She is doing quite well off of tobacco products.  She was applauded for this lifestyle change that we suspect will greatly improve her hidradenitis suppurativa control.      2.  Melasma.  Etiology, pathophysiology and treatment options were reviewed with her today in detail.  The importance of sun protection with a physical block or sunscreen was reviewed with her today in detail.  Also discussed the potential role of visible light spectrum and ambient light even when indoors.    -Recommend SPF 30 or higher broad-spectrum physical blocking sunscreen every single morning and immediately prior to outdoor sun exposure.    -We will prescribe Tri-Aissatou to be applied nightly in a 4 month on, 4 month off pattern.  A  printed script was provided and Good Rx good recommended for the patient.      All questions were answered to her apparent satisfaction.        The patient was seen and discussed with Dr. Agustina Hauser, who was staff for this encounter.      Follow up in 6 months or sooner as needed.            Staff Involved:   Dictated by Resident(Nitin Jacobsen MD)/Staff(as above)     This note was dictated and edited by MD Nitin Quezada MD  PGY3 Dermatology  [phone]   .I, Agustina Hauser MD, saw this patient with the resident and agree with the resident s findings and plan of care as documented in the resident s note.

## 2017-11-09 NOTE — LETTER
11/9/2017       RE: Gertrude Dyson  [address] [address]     Dear Colleague,    Thank you for referring your patient, Gertrude Dyson, to the [facility] DERMATOLOGY at [facility]. Please see a copy of my visit note below.    [facility] Dermatology Note      Dermatology Problem List:   1.  Hidradenitis suppurativa, mild:     - SP isotretinoin at outside clinic (elevated triglycerides and dry eyes).  Presently minocycline 50 mg b.i.d., BP wash versus Hibiclens, clindamycin lotion b.i.d. p.r.n.  Recent smoking cessation.   2.  History of eczema.   3.  Corn, right thumb, status post paring 02/23/2017.   4.  Melasma, face.  Sun protection and Tri-Aissatou.      Encounter Date:  11/09/2017      CC:  Follow up hidradenitis.      History of Present Illness:   Gertrude Dyson is a pleasant, 52-year-old woman who is seen today in routine dermatologic followup for hidradenitis suppurativa.  She was last seen in routine followup on 05/25/2017.  Since that time, the patient notes that has stopped smoking.  Her last cigarette was on 07/03/2017, and she has done well off of this.  Recent changes in her condition include intermittent bleeding stomach ulcers, which she receives appropriate care for, and a recent tonsillectomy/adenoidectomy that she has healed up from.  The patient notes that she tried to decrease down to 50 mg daily of the minocycline from 100 mg previously and unfortunately has been having significant flares every couple of weeks.  Self-titrated back up to 100 mg daily, which has been controlling things better for her.  She continues to use the clindamycin lotion twice daily.  Affected area has been on the left breast, but otherwise it has been clear elsewhere.  She has not been using the benzoyl peroxide wash as of recent given bleaching side effects overlying her bra.  No other major concerns today.  She would like to discuss melasma  treatment.  Denies significant sun-protective measures or topical treatments to date.      PAST MEDICAL HISTORY: No past medical history on file.    PAST SURGICAL HISTORY: No past surgical history on file.    FAMILY HISTORY: No family history on file.    SOCIAL HISTORY:   Social History   Substance Use Topics     Smoking status: Current Every Day Smoker     Smokeless tobacco: Not on file     Alcohol use Not on file       Current Outpatient Prescriptions   Medication     minocycline (MINOCIN/DYNACIN) 50 MG capsule     chlorhexidine (HIBICLENS) 4 % liquid     fluocin-hydroquinone-tretinoin 0.01-4-0.05 % CREA     clindamycin (CLEOCIN T) 1 % lotion     minocycline (MINOCIN/DYNACIN) 50 MG capsule     FERROUS GLUCONATE PO     aluminum chloride (DRYSOL) 20 % external solution     minocycline (DYNACIN) 100 MG tablet     METFORMIN HCL PO     sertraline (ZOLOFT) 50 MG tablet     Pregabalin (LYRICA) 200 MG CAPS     metoprolol (LOPRESSOR) 100 MG tablet     cyclobenzaprine (FLEXERIL) 10 MG tablet     SUMAtriptan (IMITREX) 50 MG tablet     latanoprost (XALATAN) 0.005 % ophthalmic solution     hydrOXYzine (VISTARIL) 25 MG capsule     methadone (DOLOPHINE) 5 MG tablet     hydrochlorothiazide (HYDRODIURIL) 25 MG tablet     aspirin 81 MG EC tablet     LANsoprazole (PREVACID SOLUTAB) 30 MG disintegrating tablet     lisinopril (PRINIVIL,ZESTRIL) 40 MG tablet     zolpidem (AMBIEN) 10 MG tablet     No current facility-administered medications for this visit.      No Known Allergies       Review of Systems:   No fevers, chills or sweats.  No side effects of minocycline, including GI, photosensitivity or rheumatologic.  Skin per HPI.         Physical exam:   GENERAL:  A well-appearing woman who appears stated age.  Affect normal.     SKIN:  A limited skin examination today is performed, including the face, neck, chest, abdomen, axillae and bilateral arms.  The patient has a somewhat flaccid, post inflammatory, hyperpigmented, scar-like, 1  cm patch under the left breast with a small, scar-like, linear plaque just adjacent.  No signs of active inflammation or superinfection.  There is no drainage today.  No cystic or sinus tract lesions appreciated.  Axillae and groin are clear today.  There is no dyschromia noted, including evaluation of the hard palate.  The patient has well-demarcated, medium-brown patches consistent with melasma over the sun-exposed areas of the face, right greater than left, particularly on the right upper forehead.  No other concerning findings on examination today.       Impression/Plan:   1.  Hidradenitis suppurativa, Glass stage 1.  Inframammary, groin and lower abdominal involvement previously.  One recurrent lesion in the left inframammary fold as of recent.  Unfortunately, she did not tolerate down-titration of minocycline 50 mg daily.  Again discussed in detail the etiology, pathophysiology, associated conditions as well as time, course and treatment options.  All questions were answered to her apparent satisfaction.     - We will continue minocycline at 100 mg daily.  (Failing 50 mg daily previously) Side effect profile of minocycline was reviewed with her today in detail.  All questions were answered to her apparent satisfaction.     - Continue clindamycin lotion b.i.d. to affected sites/body areas.   - In place of benzoyl peroxide, we will provide a prescription for Hibiclens wash that she can use to the affected areas 2-3 times weekly.   - Gertrude has recently undergone smoking cessation.  She is doing quite well off of tobacco products.  She was applauded for this lifestyle change that we suspect will greatly improve her hidradenitis suppurativa control.      2.  Melasma.  Etiology, pathophysiology and treatment options were reviewed with her today in detail.  The importance of sun protection with a physical block or sunscreen was reviewed with her today in detail.  Also discussed the potential role of visible light  spectrum and ambient light even when indoors.    -Recommend SPF 30 or higher broad-spectrum physical blocking sunscreen every single morning and immediately prior to outdoor sun exposure.    -We will prescribe Tri-Aissatou to be applied nightly in a 4 month on, 4 month off pattern.  A printed script was provided and Good Rx good recommended for the patient.      All questions were answered to her apparent satisfaction.        The patient was seen and discussed with Dr. Agustina Hauser, who was staff for this encounter.      Follow up in 6 months or sooner as needed.            Staff Involved:   Dictated by Resident(Nitin Jacobsen MD)/Staff(as above)     This note was dictated and edited by MD Nitin Quezada MD  PGY3 Dermatology  [phone]   .I, Agustina Hauser MD, saw this patient with the resident and agree with the resident s findings and plan of care as documented in the resident s note.

## 2017-11-09 NOTE — PATIENT INSTRUCTIONS
Start triluma nightly on the face for 4 month on, 4 months off ([employer] or AAKASH).     Continue on the minocycline, Clinda lotion twice a day.     Start new wash (hibiclens) 3x weekly to the affected sits.

## 2017-11-09 NOTE — MR AVS SNAPSHOT
After Visit Summary   11/9/2017    Gertrude Dyson    MRN: [id number]           Patient Information     Date Of Birth          1965        Visit Information        Provider Department      11/9/2017 9:45 AM Nitin Jacobsen MD [facility] Dermatology        Today's Diagnoses     Hidradenitis suppurativa    -  1    Melasma        Varicosities of leg          Care Instructions    Start triluma nightly on the face for 4 month on, 4 months off ([email or web address] or AAKASH).     Continue on the minocycline, Clinda lotion twice a day.     Start new wash (hibiclens) 3x weekly to the affected sits.               Follow-ups after your visit        Follow-up notes from your care team     Return in about 6 months (around 5/9/2018).      Who to contact     Please call your clinic at [phone] to:    Ask questions about your health    Make or cancel appointments    Discuss your medicines    Learn about your test results    Speak to your doctor   If you have compliments or concerns about an experience at your clinic, or if you wish to file a complaint, please contact [facility] Patient Relations at [phone] or email us at [email or web address]         Additional Information About Your Visit        MyChart Information     [employer] gives you secure access to your electronic health record. If you see a primary care provider, you can also send messages to your care team and make appointments. If you have questions, please call your primary care clinic.  If you do not have a primary care provider, please call [phone] and they will assist you.      [employer] is an electronic gateway that provides easy, online access to your medical records. With [employer], you can request a clinic appointment, read your test results, renew a prescription or communicate with your care team.     To access your existing account, please contact your [facility] or call  [phone] for assistance.        Care EveryWhere ID     This is your Care EveryWhere ID. This could be used by other organizations to access your [facility] medical records  [id number]         Blood Pressure from Last 3 Encounters:   07/07/14 110/70   01/06/14 110/63   09/16/13 110/70    Weight from Last 3 Encounters:   07/07/14 72.6 kg (160 lb)   01/06/14 73.9 kg (163 lb)   09/16/13 75 kg (165 lb 6.4 oz)              Today, you had the following     No orders found for display         Today's Medication Changes          These changes are accurate as of: 11/9/17 10:42 AM.  If you have any questions, ask your nurse or doctor.               Start taking these medicines.        Dose/Directions    chlorhexidine 4 % liquid   Commonly known as:  HIBICLENS   Used for:  Hidradenitis suppurativa        Wash affected areas 3x weekly.   Quantity:  236 mL   Refills:  2       fluocin-hydroquinone-tretinoin 0.01-4-0.05 % Crea   Used for:  Melasma        Externally apply topically At Bedtime   Quantity:  30 g   Refills:  3         These medicines have changed or have updated prescriptions.        Dose/Directions    * minocycline 100 MG tablet   Commonly known as:  DYNACIN   This may have changed:  Another medication with the same name was added. Make sure you understand how and when to take each.   Used for:  Hidradenitis suppurativa        Dose:  100 mg   Take 1 tablet (100 mg) by mouth 2 times daily   Quantity:  180 tablet   Refills:  0       * minocycline 50 MG capsule   Commonly known as:  MINOCIN/DYNACIN   This may have changed:  Another medication with the same name was added. Make sure you understand how and when to take each.   Used for:  Hidradenitis suppurativa        Dose:  50 mg   Take 1 capsule (50 mg) by mouth daily   Quantity:  60 capsule   Refills:  5       * minocycline 50 MG capsule   Commonly known as:  MINOCIN/DYNACIN   This may have changed:  You were already taking a medication with the same name, and this  prescription was added. Make sure you understand how and when to take each.   Used for:  Hidradenitis suppurativa        Dose:  100 mg   Take 2 capsules (100 mg) by mouth daily   Quantity:  60 capsule   Refills:  5       * Notice:  This list has 3 medication(s) that are the same as other medications prescribed for you. Read the directions carefully, and ask your doctor or other care provider to review them with you.         Where to get your medicines      These medications were sent to [facility] [id number] [address] [address]     Phone:  [phone]     chlorhexidine 4 % liquid    minocycline 50 MG capsule         Some of these will need a paper prescription and others can be bought over the counter.  Ask your nurse if you have questions.     Bring a paper prescription for each of these medications     fluocin-hydroquinone-tretinoin 0.01-4-0.05 % Crea                Primary Care Provider    Lisa Lam [id number] [facility]       [place]  [place] [address]        Equal Access to Services     [facility] Hospital[facility]ANESSA AH: Hadii aad ku hadasho Soomaali, waaxda luqadaha, qaybta kaalmada adeegyada, waxay idiin hayselina luis . So [facility] [phone].    ATENCIÓN: Si habla español, tiene a james disposición servicios gratuitos de asistencia lingüística. Llame al [phone].    We comply with applicable federal civil rights laws and [place] laws. We do not discriminate on the basis of race, color, national origin, age, disability, sex, sexual orientation, or gender identity.            Thank you!     Thank you for choosing [facility] DERMATOLOGY  for your care. Our goal is always to provide you with excellent care. Hearing back from our patients is one way we can continue to improve our services. Please take a few minutes to complete the written survey that you may receive in the mail after your visit with us. Thank you!             Your Updated Medication List -  Protect others around you: Learn how to safely use, store and throw away your medicines at www.disposemymeds.org.          This list is accurate as of: 11/9/17 10:42 AM.  Always use your most recent med list.                   Brand Name Dispense Instructions for use Diagnosis    aluminum chloride 20 % external solution    DRYSOL    60 mL    Apply nightly to sweaty areas (breast, arm pits, groin folds) and wash area in morning. As sweating improves, decrease use to 1-2 times weekly.    Hidradenitis suppurativa       aspirin 81 MG EC tablet      Take 81 mg by mouth daily.    Disturbance of skin sensation       chlorhexidine 4 % liquid    HIBICLENS    236 mL    Wash affected areas 3x weekly.    Hidradenitis suppurativa       clindamycin 1 % lotion    CLEOCIN T    60 mL    Apply twice daily to sites of flaring on the body.    Hidradenitis suppurativa       cyclobenzaprine 10 MG tablet    FLEXERIL     Take 1 tablet by mouth nightly as needed.        FERROUS GLUCONATE PO      Take 324 mg by mouth Pt. Unsure of dose        fluocin-hydroquinone-tretinoin 0.01-4-0.05 % Crea     30 g    Externally apply topically At Bedtime    Melasma       hydrochlorothiazide 25 MG tablet    HYDRODIURIL     Take 25 mg by mouth daily.        latanoprost 0.005 % ophthalmic solution    XALATAN     Place 1 drop into both eyes At Bedtime.        lisinopril 40 MG tablet    PRINIVIL/ZESTRIL     Take 40 mg by mouth daily.    Disturbance of skin sensation       LYRICA 200 MG capsule   Generic drug:  Pregabalin      Take 1 capsule by mouth 3 times daily.        METFORMIN HCL PO      Take 500 mg by mouth 2 times daily (with meals)        methadone 5 MG tablet    DOLOPHINE     Take 1 tablet by mouth 3 times daily. Take about 8 hours apart        metoprolol 100 MG tablet    LOPRESSOR     Take 1 tablet by mouth daily.        * minocycline 100 MG tablet    DYNACIN    180 tablet    Take 1 tablet (100 mg) by mouth 2 times daily    Hidradenitis suppurativa        * minocycline 50 MG capsule    MINOCIN/DYNACIN    60 capsule    Take 1 capsule (50 mg) by mouth daily    Hidradenitis suppurativa       * minocycline 50 MG capsule    MINOCIN/DYNACIN    60 capsule    Take 2 capsules (100 mg) by mouth daily    Hidradenitis suppurativa       PREVACID SOLUTAB 30 MG ODT tab   Generic drug:  LANsoprazole      Take 30 mg by mouth daily.    Disturbance of skin sensation       sertraline 50 MG tablet    ZOLOFT     Take 50 mg by mouth daily        SUMAtriptan 50 MG tablet    IMITREX     Take 1 tablet by mouth at onset of headache. May take 1 tab every 4 hours prn        VISTARIL 25 MG capsule   Generic drug:  hydrOXYzine      Take 1-2 capsules by mouth every 6 hours as needed. For nausea, anxiety, pain        zolpidem 10 MG tablet    AMBIEN     Take 10 mg by mouth nightly as needed.    Disturbance of skin sensation       * Notice:  This list has 3 medication(s) that are the same as other medications prescribed for you. Read the directions carefully, and ask your doctor or other care provider to review them with you.

## 2017-12-19 ENCOUNTER — TELEPHONE (OUTPATIENT)
Dept: SLEEP MEDICINE | Facility: CLINIC | Age: 52
End: 2017-12-19

## 2017-12-19 NOTE — TELEPHONE ENCOUNTER
Cpap download rev'd for Dr. Thorne to review.  Download has been scanned into chart and given to Dr. Thorne to review.    SANDI Snyder

## 2018-05-19 DIAGNOSIS — L73.2 HIDRADENITIS SUPPURATIVA: ICD-10-CM

## 2018-05-22 RX ORDER — MINOCYCLINE HYDROCHLORIDE 50 MG/1
100 CAPSULE ORAL DAILY
Qty: 60 CAPSULE | Refills: 1 | Status: SHIPPED | OUTPATIENT
Start: 2018-05-22 | End: 2018-08-19

## 2018-05-22 NOTE — TELEPHONE ENCOUNTER
Received refill request for minocycline as the resident on call. Reviewed patient's chart and attached communication. Patient last seen 11/09/2017 for hidradenitis suppurativa. RTC 6 months; patient is due for f/u this month and does not yet have an appointment scheduled. I refilled her minocycline for a 2 month supply for now, but she will need to follow-up with Dr. Jacobsen prior to additional refills being granted. It appears he has several openings in July - will route to Dr. Jacobsen's nurse, Melinda, to assist with scheduling.     Patricia Winkler MD  Dermatology PGY-2  [phone]

## 2018-05-22 NOTE — TELEPHONE ENCOUNTER
minocycline (MINOCIN/DYNACIN) 50 MG capsule    Last Written Prescription Date:  11/9/17  Last Fill Quantity: 60,   # refills: 5  Last Office Visit :11/9/17  Future Office visit:  None    Routing  Because:  dx not acne/ rosacea

## 2018-08-10 DIAGNOSIS — L73.2 HIDRADENITIS SUPPURATIVA: ICD-10-CM

## 2018-08-12 NOTE — TELEPHONE ENCOUNTER
minocycline (MINOCIN/DYNACIN) 50 MG capsule  Last Written Prescription Date:  5/22/18  Last Fill Quantity: 60,   # refills: 1  Last Office Visit : 11/9/17  Future Office visit:  None      Routing refill request to provider for review/approval because:  Not on protocol

## 2018-08-13 RX ORDER — MINOCYCLINE HYDROCHLORIDE 50 MG/1
100 CAPSULE ORAL DAILY
Qty: 60 CAPSULE | Refills: 1 | OUTPATIENT
Start: 2018-08-13

## 2018-08-14 ENCOUNTER — TELEPHONE (OUTPATIENT)
Dept: DERMATOLOGY | Facility: CLINIC | Age: 53
End: 2018-08-14

## 2018-08-14 NOTE — TELEPHONE ENCOUNTER
[facility] Call Center    Phone Message    May a detailed message be left on voicemail: yes    Reason for Call: PT has about a week and a half left of minocycline (MINOCIN/DYNACIN) 50 MG capsule but was told they couldn't get a refill until they were seen first in the clinic. The soonest appt was 10/25/18 that pt scheduled but they will need the RX refill before then. PT's pharmacy is [place] [address] [place] [address] (ph #[phone]).     Action Taken: Message routed to:  [facility] ([facility] DERM

## 2018-08-14 NOTE — TELEPHONE ENCOUNTER
Gertrude did have an appointment scheduled on 8/9 that was cancelled. Will ask coordinators to help with follow up.

## 2018-08-14 NOTE — TELEPHONE ENCOUNTER
Refill request received as the on-call resident. Pt is overdue for her follow-up appt and was already provided a refill with the provision that she make a follow-up appt. No refills will be given until she is seen in clinic or at the least has an appt scheduled and then only enough refills to get to her appt. Please help schedule an appt.     Annie Ceja MD  Dermatology Resident, PGY-3  Pager [phone]

## 2018-08-14 NOTE — TELEPHONE ENCOUNTER
I called and spoke with Gertrude and got her scheduled for 8/23 with Dr. Hancock.      Gertrude would like to know if we could just give her enough medication to get her until the appointment.

## 2018-08-19 DIAGNOSIS — L73.2 HIDRADENITIS SUPPURATIVA: ICD-10-CM

## 2018-08-20 RX ORDER — MINOCYCLINE HYDROCHLORIDE 50 MG/1
100 CAPSULE ORAL DAILY
Qty: 30 CAPSULE | Refills: 0 | Status: SHIPPED | OUTPATIENT
Start: 2018-08-20 | End: 2019-05-09 | Stop reason: ALTCHOICE

## 2018-08-20 NOTE — TELEPHONE ENCOUNTER
minocycline (MINOCIN/DYNACIN) 50 MG   Last Written Prescription Date:  5/22/18  Last Fill Quantity: 60,   # refills: 1  Last Office Visit : 11/9/17  RTC   6 MOS  Future Office visit:  8/23/18   OVERDUE     Routing refill request to provider for review/approval because:  Drug not on the refill protocol      OVERDUE APPT.

## 2018-08-20 NOTE — TELEPHONE ENCOUNTER
Medication refill request received and reviewed. Patient requesting refill of minocycline 100mg daily.  Last visit from Nov 2017 was reviewed. At that point, plan was to continue on gaurang 100mg daily but patient is overdue for an appointment. She has an appointment scheduled for 8/23/18.  Will refill medication as previously prescribed with enough refills to bridge to f/u appointment on 8/23/18.    Skye Zaidi M.D.  PGY-3 Resident  Dermatology  [facility]

## 2018-08-23 ENCOUNTER — OFFICE VISIT (OUTPATIENT)
Dept: DERMATOLOGY | Facility: CLINIC | Age: 53
End: 2018-08-23
Payer: COMMERCIAL

## 2018-08-23 DIAGNOSIS — L81.1 MELASMA: ICD-10-CM

## 2018-08-23 DIAGNOSIS — L73.2 HIDRADENITIS SUPPURATIVA: Primary | ICD-10-CM

## 2018-08-23 RX ORDER — DOXYCYCLINE 100 MG/1
100 CAPSULE ORAL DAILY
Qty: 30 CAPSULE | Refills: 5 | Status: SHIPPED | OUTPATIENT
Start: 2018-08-23 | End: 2019-02-03

## 2018-08-23 ASSESSMENT — PAIN SCALES - GENERAL: PAINLEVEL: NO PAIN (0)

## 2018-08-23 NOTE — PATIENT INSTRUCTIONS
For Melasma, you can use these cream for 3 months, then you should take 2 months of a break    Start 5 days per week, take weekends off   Hydrocortisone cream 1%   Hydroquinone cream 2%  Differin 1% gel     Other options are discoloration defense, which is made by [employer]     Elta MD- zinc sunscreen   Jocelyn Mckoy- kendra based

## 2018-08-23 NOTE — LETTER
8/23/2018       RE: Gertrude Dyson  [address] [address]     Dear Colleague,    Thank you for referring your patient, Gertrude Dyson, to the [facility]ERMAT[facility] at [facility]. Please see a copy of my visit note below.    [facility] Dermatology Note      Dermatology Problem List:   1.  Hidradenitis suppurativa, mild:   - Current doxycycline 100mg daily     - Prior treatments:  isotretinoin at outside clinic (elevated triglycerides and dry eyes). Previously well controlled minocycline 50 mg b.i.d., BP wash versus Hibiclens, clindamycin lotion b.i.d. p.r.n.  Recent smoking cessation.   2.  History of eczema.   3.  Corn, right thumb, status post paring 02/23/2017.   4.  Melasma, face.  Sun protection and (hydrocortisone 1%, adapalene gel, and OTC 2% hydroquinone)      Encounter Date:  08/25/2018     CC:  Follow up hidradenitis.      History of Present Illness:   Gertrude Dyson is a pleasant, 52-year-old woman who is see today for follow-up for hidradenitis suppurativa.  She was last seen in routine followup on 11/9/2017.          Since that time the patient's disease has been well controlled on minocycline 50mg twice daily. She states that she was off of the minocycline for 2 days when she forgot her medication during a trip to the [facility], the area on her left breast where she has chronic issues immediately became more inflamed. Her skin has been so well controlled that she has not been using clindamycin lotion or benzoyl peroxide wash.           She was unable to afford the triluma for her melasma, and has not been using sunscreen regularly. She expresses concern about darkening of all of her skin related to the minocycline, stating that her primary care expressed that he noticed darkening of the shin on her face.     PAST MEDICAL HISTORY:   Patient Active Problem List   Diagnosis     Idiopathic small fiber sensory neuropathy      PAST SURGICAL HISTORY: No past surgical history on file.    FAMILY HISTORY: No family history on file.    SOCIAL HISTORY:   Social History   Substance Use Topics     Smoking status: Current Every Day Smoker     Smokeless tobacco: Not on file     Alcohol use Not on file     Current Outpatient Prescriptions   Medication     DULoxetine HCl (CYMBALTA PO)     FERROUS GLUCONATE PO     hydrochlorothiazide (HYDRODIURIL) 25 MG tablet     lisinopril (PRINIVIL,ZESTRIL) 40 MG tablet     METFORMIN HCL PO     methadone (DOLOPHINE) 5 MG tablet     metoprolol (LOPRESSOR) 100 MG tablet     minocycline (MINOCIN/DYNACIN) 50 MG capsule     Pantoprazole Sodium (PROTONIX PO)     Pregabalin (LYRICA) 200 MG CAPS     SUMAtriptan (IMITREX) 50 MG tablet     aluminum chloride (DRYSOL) 20 % external solution     aspirin 81 MG EC tablet     chlorhexidine (HIBICLENS) 4 % liquid     clindamycin (CLEOCIN T) 1 % lotion     cyclobenzaprine (FLEXERIL) 10 MG tablet     fluocin-hydroquinone-tretinoin 0.01-4-0.05 % CREA     hydrOXYzine (VISTARIL) 25 MG capsule     LANsoprazole (PREVACID SOLUTAB) 30 MG disintegrating tablet     latanoprost (XALATAN) 0.005 % ophthalmic solution     minocycline (DYNACIN) 100 MG tablet     minocycline (MINOCIN/DYNACIN) 50 MG capsule     sertraline (ZOLOFT) 50 MG tablet     zolpidem (AMBIEN) 10 MG tablet     No current facility-administered medications for this visit.      No Known Allergies     Review of Systems:   No fevers, chills or sweats.  No side effects of minocycline, including GI, photosensitivity or rheumatologic.  Skin per HPI.         Physical exam:   GENERAL:  A well-appearing woman who appears stated age.  Affect normal.     SKIN:  A limited skin examination today is performed, including the face, neck, chest, abdomen, axillae and bilateral arms.    - There is a persistent light pink scar like ~1cm plaque on the left medial breast without any surrounding swelling or erythema.   - Bilateral axilla are clear  and there are no inflammatory acneiform papules.   - Bilateral cheeks with ill defined medium brown patches most prominent on the forehead, cheeks, and temples.   - No gray pigmentation of the bilateral dorsal forearms or hands, nor is there gray pigment on the sun exposed areas on the face      Impression/Plan:   1.  Hidradenitis suppurativa, Glass stage 1.  Inframammary, groin and lower abdominal involvement previously.  Currently still with just one persistent active area on the left medial breast.   - Stop minocycline, given patient's concern about dyspigmentation, though there is no clear evidence of this on exam today.   - Start doxycycline 100mg daily, we again reviewed that this can cause stomach upset, and that in comparison to minocycline there is a risk of sun sensitivity.   - Continue clindamycin lotion b.i.d. And hibiclens to affected sites/body areas as needed for active disease      2.  Melasma.  Reinforced the etiology, pathophysiology and the importance of sun protecting including exposure to visible light spectrum and ambient light even when indoors.    - Reiterated the importance of SPF 30 or higher broad-spectrum physical blocking sunscreen every single morning and immediately prior to outdoor sun exposure.    - Recommended patient use Hydrocortisone cream 1%, Hydroquinone cream 2%, and Differin 1% gel mixed together daily to affected dark areas on the face. Discussed that this should only be used for 3 months at a time before taking 2 months off, and the associated risk of redness and irritation. She should decrease the frequency of the creams if she has redness or irritation.     All questions were answered to her apparent satisfaction.        The patient was seen and discussed with Dr. April Parker, who was staff for this encounter.      Follow up in 3 months or sooner as needed.         Again, thank you for allowing me to participate in the care of your patient.      Sincerely,    Debby  ANTOINETTE Hancock MD

## 2018-08-23 NOTE — PROGRESS NOTES
[facility] Dermatology Note      Dermatology Problem List:   1.  Hidradenitis suppurativa, mild:   - Current doxycycline 100mg daily     - Prior treatments:  isotretinoin at outside clinic (elevated triglycerides and dry eyes). Previously well controlled on minocycline 50 mg b.i.d., BP wash versus Hibiclens, clindamycin lotion b.i.d. p.r.n.  Recent smoking cessation.   2.  History of eczema.   3.  Corn, right thumb, status post paring 02/23/2017.   4.  Melasma, face.  Sun protection and (hydrocortisone 1%, adapalene gel, and OTC 2% hydroquinone)      Encounter Date:  08/25/2018     CC:  Follow up hidradenitis.      History of Present Illness:   Gertrude Dyson is a pleasant, 52-year-old woman who is seen today for follow-up for hidradenitis suppurativa.  She was last seen in routine followup on 11/9/2017.          Since that time the patient's disease has been well controlled on minocycline 50mg twice daily. She states that she was off of the minocycline for 2 days when she forgot her medication during a trip to the [facility], the area on her left breast where she has chronic issues immediately became more inflamed. Her skin has been so well controlled that she has not been using clindamycin lotion or benzoyl peroxide wash.           She was unable to afford the triluma for her melasma, and has not been using sunscreen regularly. She expresses concern about darkening of all of her skin related to the minocycline, stating that her primary care expressed that he noticed darkening of the shin on her face.     PAST MEDICAL HISTORY:   Patient Active Problem List   Diagnosis     Idiopathic small fiber sensory neuropathy     PAST SURGICAL HISTORY: No past surgical history on file.    FAMILY HISTORY: No family history on file.    SOCIAL HISTORY:   Social History   Substance Use Topics     Smoking status: Current Every Day Smoker     Smokeless tobacco: Not on file     Alcohol use Not on file     Current  Outpatient Prescriptions   Medication     DULoxetine HCl (CYMBALTA PO)     FERROUS GLUCONATE PO     hydrochlorothiazide (HYDRODIURIL) 25 MG tablet     lisinopril (PRINIVIL,ZESTRIL) 40 MG tablet     METFORMIN HCL PO     methadone (DOLOPHINE) 5 MG tablet     metoprolol (LOPRESSOR) 100 MG tablet     minocycline (MINOCIN/DYNACIN) 50 MG capsule     Pantoprazole Sodium (PROTONIX PO)     Pregabalin (LYRICA) 200 MG CAPS     SUMAtriptan (IMITREX) 50 MG tablet     aluminum chloride (DRYSOL) 20 % external solution     aspirin 81 MG EC tablet     chlorhexidine (HIBICLENS) 4 % liquid     clindamycin (CLEOCIN T) 1 % lotion     cyclobenzaprine (FLEXERIL) 10 MG tablet     fluocin-hydroquinone-tretinoin 0.01-4-0.05 % CREA     hydrOXYzine (VISTARIL) 25 MG capsule     LANsoprazole (PREVACID SOLUTAB) 30 MG disintegrating tablet     latanoprost (XALATAN) 0.005 % ophthalmic solution     minocycline (DYNACIN) 100 MG tablet     minocycline (MINOCIN/DYNACIN) 50 MG capsule     sertraline (ZOLOFT) 50 MG tablet     zolpidem (AMBIEN) 10 MG tablet     No current facility-administered medications for this visit.      No Known Allergies     Review of Systems:   No fevers, chills or sweats.  No side effects of minocycline, including GI, photosensitivity or rheumatologic.  Skin per HPI.         Physical exam:   GENERAL:  A well-appearing woman who appears stated age.  Affect normal.     SKIN:  A limited skin examination today is performed, including the face, neck, chest, abdomen, axillae and bilateral arms.    - There is a persistent light pink scar like ~1cm plaque on the left medial breast without any surrounding swelling or erythema.   - Bilateral axilla are clear and there are no inflammatory acneiform papules.   - Bilateral cheeks with ill defined medium brown patches most prominent on the forehead, cheeks, and temples.   - No gray pigmentation of the bilateral dorsal forearms or hands, nor is there gray pigment on the sun exposed areas on the  face      Impression/Plan:   1.  Hidradenitis suppurativa, Glass stage 1.  Inframammary, groin and lower abdominal involvement previously.  Currently still with just one persistent active area on the left medial breast.   - Stop minocycline, given patient's concern about dyspigmentation, though there is no clear evidence of this on exam today.   - Start doxycycline 100mg daily, we again reviewed that this can cause stomach upset, and that in comparison to minocycline there is a risk of sun sensitivity.   - Continue clindamycin lotion b.i.d. And hibiclens to affected sites/body areas as needed for active disease      2.  Melasma.  Reinforced the etiology, pathophysiology and the importance of sun protecting including exposure to visible light spectrum and ambient light even when indoors.    - Reiterated the importance of SPF 30 or higher broad-spectrum physical blocking sunscreen every single morning and immediately prior to outdoor sun exposure.    - Recommended patient use Hydrocortisone cream 1%, Hydroquinone cream 2%, and Differin 1% gel mixed together daily to affected dark areas on the face. Discussed that this should only be used for 3 months at a time before taking 2 months off, and the associated risk of redness and irritation. She should decrease the frequency of the creams if she has redness or irritation.     All questions were answered to her apparent satisfaction.        The patient was seen and discussed with Dr. April Parker, who was staff for this encounter.      Follow up in 3 months or sooner as needed.       Debby Hancock MD  PGY-5 Internal Medicine/ Dermatology  [phone]        Patient was seen and examined with the medicine/dermatology resident. I agree with the history, review of systems, physical examination, assessments and plan.    April Parker MD  Professor and  Chair  Department of Dermatology  [facility]

## 2018-08-23 NOTE — NURSING NOTE
Dermatology Rooming Note    Gertrude Dyson's goals for this visit include:   Chief Complaint   Patient presents with     Derm Problem     HS follow up, Gertrude states she is doing well. Needs medication refills.      Melinda Dawn LPN

## 2018-08-23 NOTE — MR AVS SNAPSHOT
After Visit Summary   8/23/2018    Gertrude Dyson    MRN: [id number]           Patient Information     Date Of Birth          1965        Visit Information        Provider Department      8/23/2018 8:40 AM Debby Hancock MD [facility]        Today's Diagnoses     Hidradenitis suppurativa    -  1      Care Instructions    For Melasma, you can use these cream for 3 months, then you should take 2 months of a break    Start 5 days per week, take weekends off   Hydrocortisone cream 1%   Hydroquinone cream 2%  Differin 1% gel     Other options are discoloration defense, which is made by [employer]     Elta MD- zinc sunscreen   Jocelyn Mckoy- zinc based           Follow-ups after your visit        Your next 10 appointments already scheduled     Oct 25, 2018  8:20 AM CDT   (Arrive by 8:05 AM)   Return Visit with Nitin Jacobsen MD   [facility] ([facility])    [address] [place] [id number] [phone]              Who to contact     Please call your clinic at [phone] to:    Ask questions about your health    Make or cancel appointments    Discuss your medicines    Learn about your test results    Speak to your doctor            Additional Information About Your Visit        [employer] Information     [employer] gives you secure access to your electronic health record. If you see a primary care provider, you can also send messages to your care team and make appointments. If you have questions, please call your primary care clinic.  If you do not have a primary care provider, please call [phone] and they will assist you.      [employer] is an electronic gateway that provides easy, online access to your medical records. With [employer], you can request a clinic appointment, read your test results, renew a prescription or communicate with your care team.     To access your existing account, please contact your [facility]  [place] [facility] or call [phone] for assistance.        Care EveryWhere ID     This is your Care EveryWhere ID. This could be used by other organizations to access your [facility] medical records  [id number]         Blood Pressure from Last 3 Encounters:   07/07/14 110/70   01/06/14 110/63   09/16/13 110/70    Weight from Last 3 Encounters:   07/07/14 72.6 kg (160 lb)   01/06/14 73.9 kg (163 lb)   09/16/13 75 kg (165 lb 6.4 oz)              Today, you had the following     No orders found for display         Today's Medication Changes          These changes are accurate as of 8/23/18  9:45 AM.  If you have any questions, ask your nurse or doctor.               Start taking these medicines.        Dose/Directions    doxycycline monohydrate 100 MG capsule   Used for:  Hidradenitis suppurativa        Dose:  100 mg   Take 1 capsule (100 mg) by mouth daily   Quantity:  30 capsule   Refills:  5            Where to get your medicines      These medications were sent to [facility] [address] [address] [address]     Phone:  [phone]     doxycycline monohydrate 100 MG capsule                Primary Care Provider    Lisa Lam [id number] [facility]       DUPLICATE  [place] [address]        Equal Access to Services     CÉSAR PERALES AH: Hadii charla ku hadasho Soomaali, waaxda luqadaha, qaybta kaalmada adeegyada, waxay rasheed modi. So [facility] [phone].    ATENCIÓN: Si habla español, tiene a james disposición servicios gratzeeos de asistencia lingüística. Llame al [phone].    We comply with applicable federal civil rights laws and [place] laws. We do not discriminate on the basis of race, color, national origin, age, disability, sex, sexual orientation, or gender identity.            Thank you!     Thank you for choosing [facility]  for your care. Our goal is always to provide you with excellent care. Hearing back from our  patients is one way we can continue to improve our services. Please take a few minutes to complete the written survey that you may receive in the mail after your visit with us. Thank you!             Your Updated Medication List - Protect others around you: Learn how to safely use, store and throw away your medicines at www.disposemymeds.org.          This list is accurate as of 8/23/18  9:45 AM.  Always use your most recent med list.                   Brand Name Dispense Instructions for use Diagnosis    aluminum chloride 20 % external solution    DRYSOL    60 mL    Apply nightly to sweaty areas (breast, arm pits, groin folds) and wash area in morning. As sweating improves, decrease use to 1-2 times weekly.    Hidradenitis suppurativa       aspirin 81 MG EC tablet      Take 81 mg by mouth daily.    Disturbance of skin sensation       chlorhexidine 4 % liquid    HIBICLENS    236 mL    Wash affected areas 3x weekly.    Hidradenitis suppurativa       clindamycin 1 % lotion    CLEOCIN T    60 mL    Apply twice daily to sites of flaring on the body.    Hidradenitis suppurativa       cyclobenzaprine 10 MG tablet    FLEXERIL     Take 1 tablet by mouth nightly as needed.        CYMBALTA PO           doxycycline monohydrate 100 MG capsule     30 capsule    Take 1 capsule (100 mg) by mouth daily    Hidradenitis suppurativa       FERROUS GLUCONATE PO      Take 324 mg by mouth Pt. Unsure of dose        fluocin-hydroquinone-tretinoin 0.01-4-0.05 % Crea     30 g    Externally apply topically At Bedtime    Melasma       hydrochlorothiazide 25 MG tablet    HYDRODIURIL     Take 25 mg by mouth daily.        latanoprost 0.005 % ophthalmic solution    XALATAN     Place 1 drop into both eyes At Bedtime.        lisinopril 40 MG tablet    PRINIVIL/ZESTRIL     Take 40 mg by mouth daily.    Disturbance of skin sensation       LYRICA 200 MG capsule   Generic drug:  Pregabalin      Take 1 capsule by mouth 3 times daily.        METFORMIN HCL  PO      Take 500 mg by mouth 2 times daily (with meals)        methadone 5 MG tablet    DOLOPHINE     Take 1 tablet by mouth 3 times daily. Take about 8 hours apart        metoprolol tartrate 100 MG tablet    LOPRESSOR     Take 1 tablet by mouth daily.        * minocycline 100 MG tablet    DYNACIN    180 tablet    Take 1 tablet (100 mg) by mouth 2 times daily    Hidradenitis suppurativa       * minocycline 50 MG capsule    MINOCIN/DYNACIN    60 capsule    Take 1 capsule (50 mg) by mouth daily    Hidradenitis suppurativa       * minocycline 50 MG capsule    MINOCIN/DYNACIN    30 capsule    Take 2 capsules (100 mg) by mouth daily    Hidradenitis suppurativa       PREVACID SOLUTAB 30 MG ODT tab   Generic drug:  LANsoprazole      Take 30 mg by mouth daily.    Disturbance of skin sensation       PROTONIX PO           sertraline 50 MG tablet    ZOLOFT     Take 50 mg by mouth daily        SUMAtriptan 50 MG tablet    IMITREX     Take 1 tablet by mouth at onset of headache. May take 1 tab every 4 hours prn        VISTARIL 25 MG capsule   Generic drug:  hydrOXYzine      Take 1-2 capsules by mouth every 6 hours as needed. For nausea, anxiety, pain        zolpidem 10 MG tablet    AMBIEN     Take 10 mg by mouth nightly as needed.    Disturbance of skin sensation       * Notice:  This list has 3 medication(s) that are the same as other medications prescribed for you. Read the directions carefully, and ask your doctor or other care provider to review them with you.

## 2019-02-03 DIAGNOSIS — L73.2 HIDRADENITIS SUPPURATIVA: ICD-10-CM

## 2019-02-05 NOTE — TELEPHONE ENCOUNTER
Medication requested: doxycycline monohydrate 100 MG   Last refilled: 8/23/18  Qty: 30 :5      Last seen: 8/23/18  RTC: 3 MOS  Next appt: NONE  Routing refill request to provider for review/approval because:   DO YOU WANT  TO RF BEFORE PT MAKES APPT.  Scheduling has been notified to contact the pt for appointment

## 2019-02-06 RX ORDER — DOXYCYCLINE 100 MG/1
100 CAPSULE ORAL DAILY
Qty: 30 CAPSULE | Refills: 1 | Status: SHIPPED | OUTPATIENT
Start: 2019-02-06 | End: 2019-04-04

## 2019-02-06 NOTE — TELEPHONE ENCOUNTER
[facility] Call Center    Phone Message    May a detailed message be left on voicemail: yes    Reason for Call: Medication Refill Request    Has the patient contacted the pharmacy for the refill? Yes   Name of medication being requested: doxycycline monohydrate 100 MG capsule  Provider who prescribed the medication:   Pharmacy: [facility]  Date medication is needed: asap         Action Taken: Message routed to:  [facility] ([facility] med refills

## 2019-02-06 NOTE — TELEPHONE ENCOUNTER
Reviewed chart. Patient last seen 08/23/18 for HS. Next appointment is scheduled for 03/14/19. Will refill with enough doxycycline to bridge to that appointment.     Ann Alva MD/MPH  Internal Medicine/Dermatology  PGY-2   [phone]

## 2019-04-04 DIAGNOSIS — L73.2 HIDRADENITIS SUPPURATIVA: ICD-10-CM

## 2019-04-09 RX ORDER — DOXYCYCLINE 100 MG/1
100 CAPSULE ORAL DAILY
Qty: 30 CAPSULE | Refills: 0 | Status: SHIPPED | OUTPATIENT
Start: 2019-04-09 | End: 2019-09-03 | Stop reason: ALTCHOICE

## 2019-04-09 NOTE — TELEPHONE ENCOUNTER
doxycycline monohydrate (MONODOX) 100 MG capsule  Last Written Prescription Date:  2/6/19  Last Fill Quantity: 30,   # refills: 1  Last Office Visit : 8/23/18  Future Office visit:  5/9/18    Process 4  8/23/19  Hancock   Hidradenitis suppurativa, mild:   - Current doxycycline 100mg daily     - Prior treatments:  isotretinoin at outside clinic (elevated triglycerides and dry eyes). Previously well controlled on minocycline 50 mg b.i.d., BP wash versus Hibiclens, clindamycin lotion b.i.d. p.r.n.   Start doxycycline 100mg daily, we again reviewed that this can cause stomach upset    Routed because: high/medium drug alert

## 2019-04-09 NOTE — TELEPHONE ENCOUNTER
Refill request received. Last visit in August with plans at that point to follow-up in 3 months. Pt has requested refills previously and has canceled appointments. Next appt scheduled for 5/9. Will give enough doxy to bridge to visit, but will NOT provide additional refills beyond this if requested.     Nitin Jacobsen MD  PGY4 Dermatology  [phone]

## 2019-05-09 ENCOUNTER — OFFICE VISIT (OUTPATIENT)
Dept: DERMATOLOGY | Facility: CLINIC | Age: 54
End: 2019-05-09
Payer: COMMERCIAL

## 2019-05-09 DIAGNOSIS — L84 CORN: ICD-10-CM

## 2019-05-09 DIAGNOSIS — L73.2 HIDRADENITIS SUPPURATIVA: Primary | ICD-10-CM

## 2019-05-09 DIAGNOSIS — L81.1 MELASMA: ICD-10-CM

## 2019-05-09 RX ORDER — DOXYCYCLINE 50 MG/1
50 CAPSULE ORAL DAILY
Qty: 60 CAPSULE | Refills: 1 | Status: SHIPPED | OUTPATIENT
Start: 2019-05-09 | End: 2019-08-30

## 2019-05-09 RX ORDER — CLINDAMYCIN PHOSPHATE 10 UG/ML
LOTION TOPICAL
Qty: 60 ML | Refills: 3 | Status: SHIPPED | OUTPATIENT
Start: 2019-05-09 | End: 2019-10-22

## 2019-05-09 ASSESSMENT — PAIN SCALES - GENERAL: PAINLEVEL: NO PAIN (0)

## 2019-05-09 NOTE — PATIENT INSTRUCTIONS
For the melasma: You could use daily Ambi Fade cream ([employer]) 3 months on, 3 months off. Use diligent sun protection.     Restart daily hibiclens wash and clindamycin lotion 1-2x daily.     Decrease doxycycline to 50mg once daily    For thumb: apply salicylic acid pads or wart stick ([employer]) nightly to the spot.

## 2019-05-09 NOTE — PROGRESS NOTES
[facility] Dermatology Note      Dermatology Problem List:   1.  Hidradenitis suppurativa, mild:   - Current doxycycline 50 mg daily, hibiclens wash and clinda lotion.     - Prior treatments:  isotretinoin at outside clinic (elevated triglycerides and dry eyes). Previously well controlled on minocycline 50 mg b.i.d., BP wash. Recent smoking cessation.   2.  History of eczema.   3.  Corn, right thumb, status post paring 02/23/2017.   4.  Melasma, face.  Sun protection and (hydrocortisone 1%, adapalene gel, and OTC 2% hydroquinone)      Encounter Date:  05/09/2019     CC:  Follow up hidradenitis.      History of Present Illness:   Gertrude Dyson is a pleasant, 53-year-old woman who is seen today for follow-up for hidradenitis suppurativa.  She was last seen in routine followup on 8/23/18.  Since her last visit she has done well overall.  Tolerating doxycycline 100 mg daily without issues or side effects.  Notes that recommended topicals were cost prohibitive for her melasma, not using anything prescription there currently.  Using Neutrogena rapid tone cream daily.  Endorses great some protection.  Has had recent flaring under the left breast.  Required 1 ILK injection with her PCP over the winter.  Not using clindamycin or Hibiclens at this point.  Previously did not like benzyl peroxide.  No spreading or new areas of involvement.    PAST MEDICAL HISTORY:   Patient Active Problem List   Diagnosis     Idiopathic small fiber sensory neuropathy     PAST SURGICAL HISTORY: No past surgical history on file.    FAMILY HISTORY: No family history on file.    SOCIAL HISTORY:   Social History     Tobacco Use     Smoking status: Current Every Day Smoker   Substance Use Topics     Alcohol use: Not on file     Current Outpatient Medications   Medication     clindamycin (CLEOCIN T) 1 % external lotion     doxycycline monohydrate (MONODOX) 100 MG capsule     doxycycline monohydrate (MONODOX) 50 MG capsule      DULoxetine HCl (CYMBALTA PO)     FERROUS GLUCONATE PO     hydrochlorothiazide (HYDRODIURIL) 25 MG tablet     lisinopril (PRINIVIL,ZESTRIL) 40 MG tablet     METFORMIN HCL PO     methadone (DOLOPHINE) 5 MG tablet     metoprolol (LOPRESSOR) 100 MG tablet     Pantoprazole Sodium (PROTONIX PO)     Pregabalin (LYRICA) 200 MG CAPS     SUMAtriptan (IMITREX) 50 MG tablet     aluminum chloride (DRYSOL) 20 % external solution     aspirin 81 MG EC tablet     chlorhexidine (HIBICLENS) 4 % liquid     cyclobenzaprine (FLEXERIL) 10 MG tablet     fluocin-hydroquinone-tretinoin 0.01-4-0.05 % CREA     hydrOXYzine (VISTARIL) 25 MG capsule     LANsoprazole (PREVACID SOLUTAB) 30 MG disintegrating tablet     latanoprost (XALATAN) 0.005 % ophthalmic solution     sertraline (ZOLOFT) 50 MG tablet     zolpidem (AMBIEN) 10 MG tablet     No current facility-administered medications for this visit.      No Known Allergies     Review of Systems:   No fevers, chills or sweats.  No side effects of minocycline, including GI, photosensitivity or rheumatologic.  Skin per HPI.         Physical exam:   GENERAL:  A well-appearing woman who appears stated age.  Affect normal.     SKIN:  A limited skin examination today is performed, including the face, neck, chest, abdomen, axillae and bilateral arms.    - There is a persistent light pink scar like ~1cm plaque on the left medial breast without any surrounding swelling or erythema.   - Bilateral axilla are clear and there are no inflammatory acneiform papules. Double comedone in R axillary vault.   - Face with ill defined medium brown patches most prominent on the forehead, cheeks, and temples; fairly light and mild today.   - No gray pigmentation of the bilateral dorsal forearms or hands, nor is there gray pigment on the sun exposed areas on the face      Impression/Plan:   1.  Hidradenitis suppurativa, Glass stage 1.  Inframammary, groin and lower abdominal involvement previously. Double comedones in R  axilla. Currently still with just one persistent recently active area on the left medial breast; quiescent today.  - Decrease doxycycline 50 mg daily; hope to ultimately maintain on sub antimicrobial dosing. We again reviewed that this can cause stomach upset, and that in comparison to minocycline there is a risk of sun sensitivity.   - Restart clindamycin lotion b.i.d. and hibiclens to affected sites/body areas as needed for active disease      2.  Melasma.  Reinforced the etiology, pathophysiology and the importance of sun protecting including exposure to visible light spectrum and ambient light even when indoors.    - Previously found combination treatments cost prohibitive (including OTC options); declining today.    - Reiterated the importance of SPF 30 or higher broad-spectrum physical blocking sunscreen every single morning and immediately prior to outdoor sun exposure.    - Recommended daily Ambi fade cream in 3 week on/off pattern.     All questions were answered to her apparent satisfaction.        The patient was seen and discussed with Dr. Agustina Hauser MD, who was staff for this encounter.      Follow up in 4 months or sooner as needed.       Nitin Jacobsen MD  PGY4 Dermatology  [phone]  .I, Agustina Hauser MD, saw this patient with the resident and agree with the resident s findings and plan of care as documented in the resident s note.

## 2019-05-09 NOTE — NURSING NOTE
Dermatology Rooming Note    Gertrude Dyson's goals for this visit include:   Chief Complaint   Patient presents with     Derm Problem     HS follow up, Gertrude states she is doing well.      Melinda Dawn LPN

## 2019-05-09 NOTE — LETTER
5/9/2019       RE: Gertrude Dyson  [address] [address]     Dear Colleague,    Thank you for referring your patient, Gertrude Dyson, to the [facility] DERMATOLOGY at [facility]. Please see a copy of my visit note below.    [facility] Dermatology Note      Dermatology Problem List:   1.  Hidradenitis suppurativa, mild:   - Current doxycycline 50 mg daily, hibiclens wash and clinda lotion.     - Prior treatments:  isotretinoin at outside clinic (elevated triglycerides and dry eyes). Previously well controlled on minocycline 50 mg b.i.d., BP wash. Recent smoking cessation.   2.  History of eczema.   3.  Corn, right thumb, status post paring 02/23/2017.   4.  Melasma, face.  Sun protection and (hydrocortisone 1%, adapalene gel, and OTC 2% hydroquinone)      Encounter Date:  05/09/2019     CC:  Follow up hidradenitis.      History of Present Illness:   Gertrude Dyson is a pleasant, 53-year-old woman who is seen today for follow-up for hidradenitis suppurativa.  She was last seen in routine followup on 8/23/18.  Since her last visit she has done well overall.  Tolerating doxycycline 100 mg daily without issues or side effects.  Notes that recommended topicals were cost prohibitive for her melasma, not using anything prescription there currently.  Using Neutrogena rapid tone cream daily.  Endorses great some protection.  Has had recent flaring under the left breast.  Required 1 ILK injection with her PCP over the winter.  Not using clindamycin or Hibiclens at this point.  Previously did not like benzyl peroxide.  No spreading or new areas of involvement.    PAST MEDICAL HISTORY:   Patient Active Problem List   Diagnosis     Idiopathic small fiber sensory neuropathy     PAST SURGICAL HISTORY: No past surgical history on file.    FAMILY HISTORY: No family history on file.    SOCIAL HISTORY:   Social History     Tobacco Use     Smoking status:  Current Every Day Smoker   Substance Use Topics     Alcohol use: Not on file     Current Outpatient Medications   Medication     clindamycin (CLEOCIN T) 1 % external lotion     doxycycline monohydrate (MONODOX) 100 MG capsule     doxycycline monohydrate (MONODOX) 50 MG capsule     DULoxetine HCl (CYMBALTA PO)     FERROUS GLUCONATE PO     hydrochlorothiazide (HYDRODIURIL) 25 MG tablet     lisinopril (PRINIVIL,ZESTRIL) 40 MG tablet     METFORMIN HCL PO     methadone (DOLOPHINE) 5 MG tablet     metoprolol (LOPRESSOR) 100 MG tablet     Pantoprazole Sodium (PROTONIX PO)     Pregabalin (LYRICA) 200 MG CAPS     SUMAtriptan (IMITREX) 50 MG tablet     aluminum chloride (DRYSOL) 20 % external solution     aspirin 81 MG EC tablet     chlorhexidine (HIBICLENS) 4 % liquid     cyclobenzaprine (FLEXERIL) 10 MG tablet     fluocin-hydroquinone-tretinoin 0.01-4-0.05 % CREA     hydrOXYzine (VISTARIL) 25 MG capsule     LANsoprazole (PREVACID SOLUTAB) 30 MG disintegrating tablet     latanoprost (XALATAN) 0.005 % ophthalmic solution     sertraline (ZOLOFT) 50 MG tablet     zolpidem (AMBIEN) 10 MG tablet     No current facility-administered medications for this visit.      No Known Allergies     Review of Systems:   No fevers, chills or sweats.  No side effects of minocycline, including GI, photosensitivity or rheumatologic.  Skin per HPI.         Physical exam:   GENERAL:  A well-appearing woman who appears stated age.  Affect normal.     SKIN:  A limited skin examination today is performed, including the face, neck, chest, abdomen, axillae and bilateral arms.    - There is a persistent light pink scar like ~1cm plaque on the left medial breast without any surrounding swelling or erythema.   - Bilateral axilla are clear and there are no inflammatory acneiform papules. Double comedone in R axillary vault.   - Face with ill defined medium brown patches most prominent on the forehead, cheeks, and temples; fairly light and mild today.   - No  gray pigmentation of the bilateral dorsal forearms or hands, nor is there gray pigment on the sun exposed areas on the face      Impression/Plan:   1.  Hidradenitis suppurativa, Glass stage 1.  Inframammary, groin and lower abdominal involvement previously. Double comedones in R axilla. Currently still with just one persistent recently active area on the left medial breast; quiescent today.  - Decrease doxycycline 50 mg daily; hope to ultimately maintain on sub antimicrobial dosing. We again reviewed that this can cause stomach upset, and that in comparison to minocycline there is a risk of sun sensitivity.   - Restart clindamycin lotion b.i.d. and hibiclens to affected sites/body areas as needed for active disease      2.  Melasma.  Reinforced the etiology, pathophysiology and the importance of sun protecting including exposure to visible light spectrum and ambient light even when indoors.    - Previously found combination treatments cost prohibitive (including OTC options); declining today.    - Reiterated the importance of SPF 30 or higher broad-spectrum physical blocking sunscreen every single morning and immediately prior to outdoor sun exposure.    - Recommended daily Ambi fade cream in 3 week on/off pattern.     All questions were answered to her apparent satisfaction.        The patient was seen and discussed with Dr. Agustina Hauser MD, who was staff for this encounter.      Follow up in 4 months or sooner as needed.       Nitin Jacobsen MD  PGY4 Dermatology  [phone]  .I, Agustina Hauser MD, saw this patient with the resident and agree with the resident s findings and plan of care as documented in the resident s note.

## 2019-08-30 DIAGNOSIS — L73.2 HIDRADENITIS SUPPURATIVA: ICD-10-CM

## 2019-09-03 RX ORDER — DOXYCYCLINE 50 MG/1
50 CAPSULE ORAL DAILY
Qty: 30 CAPSULE | Refills: 1 | Status: SHIPPED | OUTPATIENT
Start: 2019-09-03 | End: 2019-10-22

## 2019-09-03 NOTE — TELEPHONE ENCOUNTER
Will provide fill through follow-up (message sent to clinic coordinators). Appears to be a continuity clinic patient; ok to schedule into my panel. FYI to Dr. Hauser.     Soy Tracey MD  Internal Medicine - Dermatology PGY 2  [phone]

## 2019-09-03 NOTE — TELEPHONE ENCOUNTER
doxycycline monohydrate (MONODOX) 50 MG capsule      Last Written Prescription Date:  5/9/19  Last Fill Quantity: 60,   # refills: 1  Last Office Visit : 5/9/19  Future Office visit:  No pending appointment- Recall list date 9/9/19    Routing refill request to provider for review/approval because:  RTC recc date due now.  Message sent to clinic coordinator for scheduling.      Plan last visit 5/9/19:  Hidradenitis suppurativa, Glass stage 1.  Inframammary, groin and lower abdominal involvement previously. Double comedones in R axilla. Currently still with just one persistent recently active area on the left medial breast; quiescent today.  - Decrease doxycycline 50 mg daily; hope to ultimately maintain on sub antimicrobial dosing. We again reviewed that this can cause stomach upset, and that in comparison to gaurang  cycline there is a risk of sun sensitivity.   - Restart clindamycin lotion b.i.d. and hibiclens to affected sites/body areas as needed for active disease      RTC: Follow up in 4 months.  Recall list date of 9/9/19- no appointment scheduled at this time.  Message sent to clinic coordinator.

## 2019-10-22 ENCOUNTER — OFFICE VISIT (OUTPATIENT)
Dept: DERMATOLOGY | Facility: CLINIC | Age: 54
End: 2019-10-22
Payer: COMMERCIAL

## 2019-10-22 DIAGNOSIS — L73.2 HIDRADENITIS SUPPURATIVA: ICD-10-CM

## 2019-10-22 RX ORDER — AZELASTINE HYDROCHLORIDE 0.5 MG/ML
SOLUTION/ DROPS OPHTHALMIC
Refills: 5 | COMMUNITY
Start: 2019-09-17

## 2019-10-22 RX ORDER — DOXYCYCLINE 50 MG/1
50 CAPSULE ORAL DAILY
Qty: 30 CAPSULE | Refills: 11 | Status: SHIPPED | OUTPATIENT
Start: 2019-10-22 | End: 2020-10-14

## 2019-10-22 RX ORDER — ATORVASTATIN CALCIUM 20 MG/1
TABLET, FILM COATED ORAL
Refills: 0 | COMMUNITY
Start: 2019-08-03

## 2019-10-22 RX ORDER — CLINDAMYCIN PHOSPHATE 10 UG/ML
LOTION TOPICAL
Qty: 60 ML | Refills: 11 | Status: SHIPPED | OUTPATIENT
Start: 2019-10-22 | End: 2022-04-15

## 2019-10-22 ASSESSMENT — PAIN SCALES - GENERAL: PAINLEVEL: NO PAIN (0)

## 2019-10-22 NOTE — NURSING NOTE
"Chief Complaint   Patient presents with     Derm Problem     Gertrude is here today for HS follow up. Gertrude notes \"one tiny flare up on right breast\" within six months.      Darline Patel LPN    "

## 2019-10-22 NOTE — LETTER
"10/22/2019       RE: Gertrude Dyson  [address] [facility] [address]     Dear Colleague,    Thank you for referring your patient, Gertrude Dyson, to the [facility] DERMATOLOGY at [facility]. Please see a copy of my visit note below.    [facility] Dermatology Note      Dermatology Problem List:  1. Hidradenitis suppurativa, mild:   - Current doxycycline 50 mg daily, hibiclens wash and clinda lotion.     - Prior treatments:  isotretinoin at outside clinic (elevated triglycerides and dry eyes). Previously well controlled on minocycline 50 mg b.i.d., BP wash. Recent smoking cessation.   2.  History of eczema.   3.  Corn, right thumb, status post paring 02/23/2017.   4.  Melasma, face.  Sun protection and (hydrocortisone 1%, adapalene gel, and OTC 2% hydroquinone)     Encounter Date: Oct 22, 2019    CC:  Chief Complaint   Patient presents with     Derm Problem     Gertrude is here today for HS follow up. Gertrude notes \"one tiny flare up on right breast\" within six months.          History of Present Illness:  Ms. Gertrude Dyson is a 54 year old female who presents as a follow-up for hidradenitis suppurativa. The patient was last seen 5/9/19 when she decreased doxycycline to 50 mg daily and she restarted clindamycin. Today, the patient reports that she has been taking doxycycline and using clindamycin lotion regularly, and she has been tolerating the medication well. She notes that she has developed one mild HS flare in her inframammary folds, but this has since resolved. Of note, she is currently trying to quit smoking, and she is planning on quitting by the beginning of the winter. She has a history of DM, and she is currently taking metformin 500 mg daily. The patient voices no other concerns.      Past Medical History:   Patient Active Problem List   Diagnosis     Idiopathic small fiber sensory neuropathy     No past medical history on file.  No past " surgical history on file.    Social History:  Patient reports that she has been smoking. She has been smoking about 0.00 packs per day. She has never used smokeless tobacco.    Family History:  No family history on file.    Medications:  Current Outpatient Medications   Medication Sig Dispense Refill     atorvastatin (LIPITOR) 20 MG tablet TAKE 1 TABLET BY MOUTH ONCE DAILY WITH EVENING MEAL.  0     azelastine (OPTIVAR) 0.05 % ophthalmic solution INSTILL 1 DROP INTO AFFECTED EYE TWICE A DAY  5     clindamycin (CLEOCIN T) 1 % external lotion Apply twice daily to sites of flaring on the body. 60 mL 3     doxycycline monohydrate (MONODOX) 50 MG capsule Take 1 capsule (50 mg) by mouth daily 30 capsule 1     DULoxetine HCl (CYMBALTA PO)        hydrochlorothiazide (HYDRODIURIL) 25 MG tablet Take 25 mg by mouth daily.       lisinopril (PRINIVIL,ZESTRIL) 40 MG tablet Take 40 mg by mouth daily.       methadone (DOLOPHINE) 5 MG tablet Take 1 tablet by mouth 3 times daily. Take about 8 hours apart       metoprolol (LOPRESSOR) 100 MG tablet Take 1 tablet by mouth daily.       Pantoprazole Sodium (PROTONIX PO)        aluminum chloride (DRYSOL) 20 % external solution Apply nightly to sweaty areas (breast, arm pits, groin folds) and wash area in morning. As sweating improves, decrease use to 1-2 times weekly. (Patient not taking: Reported on 8/23/2018) 60 mL 3     aspirin 81 MG EC tablet Take 81 mg by mouth daily.       chlorhexidine (HIBICLENS) 4 % liquid Wash affected areas 3x weekly. (Patient not taking: Reported on 8/23/2018) 236 mL 2     cyclobenzaprine (FLEXERIL) 10 MG tablet Take 1 tablet by mouth nightly as needed.       FERROUS GLUCONATE PO Take 324 mg by mouth Pt. Unsure of dose       fluocin-hydroquinone-tretinoin 0.01-4-0.05 % CREA Externally apply topically At Bedtime (Patient not taking: Reported on 8/23/2018) 30 g 3     hydrOXYzine (VISTARIL) 25 MG capsule Take 1-2 capsules by mouth every 6 hours as needed. For  nausea, anxiety, pain       LANsoprazole (PREVACID SOLUTAB) 30 MG disintegrating tablet Take 30 mg by mouth daily.       latanoprost (XALATAN) 0.005 % ophthalmic solution Place 1 drop into both eyes At Bedtime.       METFORMIN HCL PO Take 500 mg by mouth 2 times daily (with meals)       Pregabalin (LYRICA) 200 MG CAPS Take 1 capsule by mouth 3 times daily.       sertraline (ZOLOFT) 50 MG tablet Take 50 mg by mouth daily       SUMAtriptan (IMITREX) 50 MG tablet Take 1 tablet by mouth at onset of headache. May take 1 tab every 4 hours prn       zolpidem (AMBIEN) 10 MG tablet Take 10 mg by mouth nightly as needed.         No Known Allergies    Review of Systems:  -Constitutional: Otherwise feeling well today, in usual state of health.  -HEENT: Patient denies nonhealing oral sores.  -Skin: As above in HPI. No additional skin concerns.    Physical exam:  Vitals: There were no vitals taken for this visit.  GEN: This is a well developed, well-nourished female in no acute distress, in a pleasant mood.    SKIN: Waist-up skin, which includes the head/face, neck, both arms, chest, back, abdomen, digits and/or nails was examined.  -Leblanc skin type: II  There are 2 hyperpigmented scars with no active erythema or fluctuance. The larger lesion is in the L inframammary fold measuring 7mm, and the smaller is in her R inframammary fold measuring 3mm  -No other lesions of concern on areas examined.       Impression/Plan:  1. Hidradenitis suppurativa, Glass stage 1.  Inframammary, groin and lower abdominal involvement previously. Not active on exam today.    Continue doxycycline 50 mg daily; hope to ultimately maintain on sub antimicrobial dosing. We again reviewed that this can cause stomach upset, and that in comparison to minocycline there is a risk of sun sensitivity.     Continue clindamycin lotion b.i.d. and hibiclens to affected sites/body areas as needed for active disease      We discussed the benefits of smoking  cessation, and the patient endorses a plan to quit    Follow-up in 6 months, earlier for new or changing lesions.     Staff Involved:  Scribe/Staff    Scribe Disclosure  I, Dominic Najjar, am serving as a scribe to document services personally performed by Dr. Asif Leung MD, based on data collection and the provider's statements to me.     Staff attestation:  The documentation recorded by the scribe accurately reflects the services I personally performed and the decisions I personally made. I have made edits where needed.    Asif Leung MD  Staff Dermatologist and Dermatopathologist  , Department of Dermatology

## 2019-10-22 NOTE — PROGRESS NOTES
"[facility] Dermatology Note      Dermatology Problem List:  1. Hidradenitis suppurativa, mild:   - Current doxycycline 50 mg daily, hibiclens wash and clinda lotion.     - Prior treatments:  isotretinoin at outside clinic (elevated triglycerides and dry eyes). Previously well controlled on minocycline 50 mg b.i.d., BP wash. Recent smoking cessation.   2.  History of eczema.   3.  Corn, right thumb, status post paring 02/23/2017.   4.  Melasma, face.  Sun protection and (hydrocortisone 1%, adapalene gel, and OTC 2% hydroquinone)     Encounter Date: Oct 22, 2019    CC:  Chief Complaint   Patient presents with     Derm Problem     Gertrude is here today for HS follow up. Gertrude notes \"one tiny flare up on right breast\" within six months.          History of Present Illness:  Ms. Gertrude Dyson is a 54 year old female who presents as a follow-up for hidradenitis suppurativa. The patient was last seen 5/9/19 when she decreased doxycycline to 50 mg daily and she restarted clindamycin. Today, the patient reports that she has been taking doxycycline and using clindamycin lotion regularly, and she has been tolerating the medication well. She notes that she has developed one mild HS flare in her inframammary folds, but this has since resolved. Of note, she is currently trying to quit smoking, and she is planning on quitting by the beginning of the winter. She has a history of DM, and she is currently taking metformin 500 mg daily. The patient voices no other concerns.      Past Medical History:   Patient Active Problem List   Diagnosis     Idiopathic small fiber sensory neuropathy     No past medical history on file.  No past surgical history on file.    Social History:  Patient reports that she has been smoking. She has been smoking about 0.00 packs per day. She has never used smokeless tobacco.    Family History:  No family history on file.    Medications:  Current Outpatient Medications   Medication Sig " Dispense Refill     atorvastatin (LIPITOR) 20 MG tablet TAKE 1 TABLET BY MOUTH ONCE DAILY WITH EVENING MEAL.  0     azelastine (OPTIVAR) 0.05 % ophthalmic solution INSTILL 1 DROP INTO AFFECTED EYE TWICE A DAY  5     clindamycin (CLEOCIN T) 1 % external lotion Apply twice daily to sites of flaring on the body. 60 mL 3     doxycycline monohydrate (MONODOX) 50 MG capsule Take 1 capsule (50 mg) by mouth daily 30 capsule 1     DULoxetine HCl (CYMBALTA PO)        hydrochlorothiazide (HYDRODIURIL) 25 MG tablet Take 25 mg by mouth daily.       lisinopril (PRINIVIL,ZESTRIL) 40 MG tablet Take 40 mg by mouth daily.       methadone (DOLOPHINE) 5 MG tablet Take 1 tablet by mouth 3 times daily. Take about 8 hours apart       metoprolol (LOPRESSOR) 100 MG tablet Take 1 tablet by mouth daily.       Pantoprazole Sodium (PROTONIX PO)        aluminum chloride (DRYSOL) 20 % external solution Apply nightly to sweaty areas (breast, arm pits, groin folds) and wash area in morning. As sweating improves, decrease use to 1-2 times weekly. (Patient not taking: Reported on 8/23/2018) 60 mL 3     aspirin 81 MG EC tablet Take 81 mg by mouth daily.       chlorhexidine (HIBICLENS) 4 % liquid Wash affected areas 3x weekly. (Patient not taking: Reported on 8/23/2018) 236 mL 2     cyclobenzaprine (FLEXERIL) 10 MG tablet Take 1 tablet by mouth nightly as needed.       FERROUS GLUCONATE PO Take 324 mg by mouth Pt. Unsure of dose       fluocin-hydroquinone-tretinoin 0.01-4-0.05 % CREA Externally apply topically At Bedtime (Patient not taking: Reported on 8/23/2018) 30 g 3     hydrOXYzine (VISTARIL) 25 MG capsule Take 1-2 capsules by mouth every 6 hours as needed. For nausea, anxiety, pain       LANsoprazole (PREVACID SOLUTAB) 30 MG disintegrating tablet Take 30 mg by mouth daily.       latanoprost (XALATAN) 0.005 % ophthalmic solution Place 1 drop into both eyes At Bedtime.       METFORMIN HCL PO Take 500 mg by mouth 2 times daily (with meals)        Pregabalin (LYRICA) 200 MG CAPS Take 1 capsule by mouth 3 times daily.       sertraline (ZOLOFT) 50 MG tablet Take 50 mg by mouth daily       SUMAtriptan (IMITREX) 50 MG tablet Take 1 tablet by mouth at onset of headache. May take 1 tab every 4 hours prn       zolpidem (AMBIEN) 10 MG tablet Take 10 mg by mouth nightly as needed.         No Known Allergies    Review of Systems:  -Constitutional: Otherwise feeling well today, in usual state of health.  -HEENT: Patient denies nonhealing oral sores.  -Skin: As above in HPI. No additional skin concerns.    Physical exam:  Vitals: There were no vitals taken for this visit.  GEN: This is a well developed, well-nourished female in no acute distress, in a pleasant mood.    SKIN: Waist-up skin, which includes the head/face, neck, both arms, chest, back, abdomen, digits and/or nails was examined.  -Leblanc skin type: II  There are 2 hyperpigmented scars with no active erythema or fluctuance. The larger lesion is in the L inframammary fold measuring 7mm, and the smaller is in her R inframammary fold measuring 3mm  -No other lesions of concern on areas examined.       Impression/Plan:  1. Hidradenitis suppurativa, Glass stage 1.  Inframammary, groin and lower abdominal involvement previously. Not active on exam today.    Continue doxycycline 50 mg daily; hope to ultimately maintain on sub antimicrobial dosing. We again reviewed that this can cause stomach upset, and that in comparison to minocycline there is a risk of sun sensitivity.     Continue clindamycin lotion b.i.d. and hibiclens to affected sites/body areas as needed for active disease      We discussed the benefits of smoking cessation, and the patient endorses a plan to quit    Follow-up in 6 months, earlier for new or changing lesions.     Staff Involved:  Scribe/Staff    Scribe Disclosure  I, Dominic Najjar, am serving as a scribe to document services personally performed by Dr. Asif Leung MD, based on data  collection and the provider's statements to me.     Staff attestation:  The documentation recorded by the scribe accurately reflects the services I personally performed and the decisions I personally made. I have made edits where needed.    Asif Leung MD  Staff Dermatologist and Dermatopathologist  , Department of Dermatology

## 2020-03-01 ENCOUNTER — HEALTH MAINTENANCE LETTER (OUTPATIENT)
Age: 55
End: 2020-03-01

## 2020-03-19 ENCOUNTER — DOCUMENTATION ONLY (OUTPATIENT)
Dept: CARE COORDINATION | Facility: CLINIC | Age: 55
End: 2020-03-19

## 2020-10-11 DIAGNOSIS — L73.2 HIDRADENITIS SUPPURATIVA: ICD-10-CM

## 2020-10-14 NOTE — TELEPHONE ENCOUNTER
DOXYCYCLINE MONO 50 MG CAP      Last Written Prescription Date: 10/22/19   Last Fill Quantity: 30   # refills: 11  Last Office Visit : 10/22/19 f/u in 6 months  My chart note states doing well 3/2020 and was instructed to cancel due to COVID  Future Office visit:  0    Routing refill request to provider for review/approval because:  Last my chart message states doing well to cancel because of COVID and reschedule when flareup

## 2020-10-15 RX ORDER — DOXYCYCLINE 50 MG/1
50 CAPSULE ORAL DAILY
Qty: 90 CAPSULE | Refills: 0 | Status: SHIPPED | OUTPATIENT
Start: 2020-10-15 | End: 2021-01-22

## 2020-10-15 NOTE — TELEPHONE ENCOUNTER
"Received refill request for \"doxycycline 50mg daily\" as the resident on call. Reviewed patient's chart and attached communication. Patient last seen 10/22/19 for HS. RTC was to be in 4/2020 but cancelled due to covid. Need appt for f/u.    After reviewing the medication list and/ assessment and plan from last visit, the refill request was accepted but only 90 day supply    CC'ing Dr. Leung as NAIF Johns MD  Med/Derm PGY-3  P: [phone]  "

## 2020-10-20 ENCOUNTER — TELEPHONE (OUTPATIENT)
Dept: DERMATOLOGY | Facility: CLINIC | Age: 55
End: 2020-10-20

## 2020-10-20 NOTE — TELEPHONE ENCOUNTER
----- Message from Ann Devine CMA sent at 10/16/2020 10:08 AM CDT -----  Regarding: FW: follow up    ----- Message -----  From: Brandan Johns MD  Sent: 10/15/2020  11:48 AM CDT  To: [facility]  Subject: follow up                                        Hey y'all,    Could we please call this patient to set up an appointment please (either virtual or inperson) for HS?    Thank you!  Spencer

## 2020-10-23 ENCOUNTER — TELEPHONE (OUTPATIENT)
Dept: DERMATOLOGY | Facility: CLINIC | Age: 55
End: 2020-10-23

## 2020-10-23 NOTE — TELEPHONE ENCOUNTER
----- Message from Ann Devine CMA sent at 10/16/2020 10:08 AM CDT -----  Regarding: FW: follow up    ----- Message -----  From: Brandan Johns MD  Sent: 10/15/2020  11:48 AM CDT  To: [facility] [place]  Subject: follow up                                        Hey y'all,    Could we please call this patient to set up an appointment please (either virtual or inperson) for HS?    Thank you!  Spencer

## 2020-12-14 ENCOUNTER — HEALTH MAINTENANCE LETTER (OUTPATIENT)
Age: 55
End: 2020-12-14

## 2021-01-21 ENCOUNTER — TELEPHONE (OUTPATIENT)
Dept: DERMATOLOGY | Facility: CLINIC | Age: 56
End: 2021-01-21

## 2021-01-21 DIAGNOSIS — L73.2 HIDRADENITIS SUPPURATIVA: ICD-10-CM

## 2021-01-21 NOTE — TELEPHONE ENCOUNTER
Shermanm for patient to call back and schedule an appointment with  Dermatology Clinic       Suha Pink on 1/21/2021 at 10:06 AM

## 2021-01-22 RX ORDER — DOXYCYCLINE 50 MG/1
50 CAPSULE ORAL DAILY
Qty: 90 CAPSULE | Refills: 0 | Status: SHIPPED | OUTPATIENT
Start: 2021-01-22 | End: 2021-04-20

## 2021-01-22 NOTE — TELEPHONE ENCOUNTER
DOXYCYCLINE MONO 50 MG CAP      Last Written Prescription Date: 10/15/20   Last Fill Quantity:90   # refills:0  Last Office Visit : 10/22/19 f/u in 6 months  My chart note states doing well 3/2020 and was instructed to cancel due to COVID  Future Office visit:  0  Refill process #2> refill x 6 months     Routing refill request to provider for review/approval because:  Last my chart message states doing well to cancel because of COVID and reschedule when flareup   Scheduling has been notified to contact the pt for appointment.

## 2021-01-22 NOTE — TELEPHONE ENCOUNTER
Received refill request for doxycycline. Patient chart reviewed. Refill accepted. Rx routed to Dr. Leung.    Zahida Tsai MD  Dermatology Resident  [facility]

## 2021-03-24 ENCOUNTER — DOCUMENTATION ONLY (OUTPATIENT)
Dept: CARE COORDINATION | Facility: CLINIC | Age: 56
End: 2021-03-24

## 2021-04-14 DIAGNOSIS — L73.2 HIDRADENITIS SUPPURATIVA: ICD-10-CM

## 2021-04-15 RX ORDER — DOXYCYCLINE 50 MG/1
50 CAPSULE ORAL DAILY
Qty: 90 CAPSULE | Refills: 0 | OUTPATIENT
Start: 2021-04-15

## 2021-04-15 NOTE — TELEPHONE ENCOUNTER
My name is Dr. Yousuf Son, and I am the dermatology resident on call.    The patient should have enough doxycycline to last her until her upcoming dermatology appointment on 4/20/21, so we will defer this refill request for now, and appropriateness of refills can be considered at that appointment.

## 2021-04-17 ENCOUNTER — HEALTH MAINTENANCE LETTER (OUTPATIENT)
Age: 56
End: 2021-04-17

## 2021-04-20 ENCOUNTER — OFFICE VISIT (OUTPATIENT)
Dept: DERMATOLOGY | Facility: CLINIC | Age: 56
End: 2021-04-20
Payer: COMMERCIAL

## 2021-04-20 DIAGNOSIS — L73.2 HIDRADENITIS SUPPURATIVA: ICD-10-CM

## 2021-04-20 PROCEDURE — 99213 OFFICE O/P EST LOW 20 MIN: CPT | Performed by: DERMATOLOGY

## 2021-04-20 RX ORDER — CYCLOSPORINE 0.5 MG/ML
1 EMULSION OPHTHALMIC 2 TIMES DAILY
COMMUNITY

## 2021-04-20 RX ORDER — CALCIUM CARBONATE 500 MG/1
1 TABLET, CHEWABLE ORAL 2 TIMES DAILY
COMMUNITY

## 2021-04-20 RX ORDER — PREGABALIN 150 MG/1
150 CAPSULE ORAL 2 TIMES DAILY
COMMUNITY

## 2021-04-20 RX ORDER — MODAFINIL 200 MG/1
200 TABLET ORAL DAILY
COMMUNITY

## 2021-04-20 RX ORDER — SUMATRIPTAN 25 MG/1
25 TABLET, FILM COATED ORAL
COMMUNITY

## 2021-04-20 RX ORDER — CHLORAL HYDRATE 500 MG
2 CAPSULE ORAL DAILY
COMMUNITY

## 2021-04-20 RX ORDER — DOXYCYCLINE 50 MG/1
50 CAPSULE ORAL DAILY
Qty: 90 CAPSULE | Refills: 3 | Status: SHIPPED | OUTPATIENT
Start: 2021-04-20 | End: 2023-10-30

## 2021-04-20 ASSESSMENT — PAIN SCALES - GENERAL: PAINLEVEL: NO PAIN (0)

## 2021-04-20 NOTE — NURSING NOTE
Chief Complaint   Patient presents with     Derm Problem     Patient is here for her regular follow up.   Kaylin Lockett on 4/20/2021 at 3:13 PM

## 2021-04-20 NOTE — LETTER
"4/20/2021       RE: Gertrude Dyson  [address] [id number]     Dear Colleague,    Thank you for referring your patient, Gertrude Dyson, to the [facility]MAT[facility] [place] at [facility]. Please see a copy of my visit note below.    [facility] Dermatology Note      Dermatology Problem List:  #. Hidradenitis suppurativa, mild:   - Current doxycycline 50 mg daily, hibiclens wash and clinda lotion.     - Prior treatments:  isotretinoin at outside clinic (elevated triglycerides and dry eyes). Previously well controlled on minocycline 50 mg b.i.d., BP wash. Recent smoking cessation.   #.  History of eczema.   #.  Corn, right thumb, status post paring 02/23/2017.   #.  Melasma, face.  Sun protection and (hydrocortisone 1%, adapalene gel, and OTC 2% hydroquinone)   #. EIC L inframammary - monitor, offered surgical intervention but politely declined    Encounter Date: Apr 20, 2021    CC:  Chief Complaint   Patient presents with     Derm Problem     Patient is here for her regular follow up.         History of Present Illness:  Ms. Gertrude Dyson is a 55 year old female who presents as a follow-up for hidradenitis suppurativa.     She was last seen in October 2019, before the coronavirus pandemic.  Since that time, she notes 2 notable flareups, where cysts or abscesses \"filled up\" -in one case, one lesion did not drain out, and the other drained a small amount.  She notes this is significantly better than prior to her initial presentation.  She is currently on doxycycline 50 mg daily, Hibiclens washes, advised 1% lotion twice daily to any inflamed areas.  She uses an SPF 30 or greater sunscreen due to the photosensitive potential doxycycline.    She notes a firm nodule under her left breast which is somewhat bothersome, but she does not think that she would want surgery and it was harmless.      Past Medical " History:   Patient Active Problem List   Diagnosis     Idiopathic small fiber sensory neuropathy     No past medical history on file.  No past surgical history on file.    Social History:  Patient reports that she has been smoking. She has been smoking about 0.00 packs per day. She has never used smokeless tobacco.    Family History:  No family history on file.    Medications:  Current Outpatient Medications   Medication Sig Dispense Refill     atorvastatin (LIPITOR) 20 MG tablet TAKE 1 TABLET BY MOUTH ONCE DAILY WITH EVENING MEAL.  0     azelastine (OPTIVAR) 0.05 % ophthalmic solution INSTILL 1 DROP INTO AFFECTED EYE TWICE A DAY  5     calcium carbonate (TUMS) 500 MG chewable tablet Take 1 chew tab by mouth 2 times daily       cycloSPORINE (RESTASIS) 0.05 % ophthalmic emulsion 1 drop 2 times daily       doxycycline monohydrate (MONODOX) 50 MG capsule Take 1 capsule (50 mg) by mouth daily 90 capsule 3     DULoxetine HCl (CYMBALTA PO)        fish oil-omega-3 fatty acids 1000 MG capsule Take 2 g by mouth daily       hydrochlorothiazide (HYDRODIURIL) 25 MG tablet Take 25 mg by mouth daily.       lisinopril (PRINIVIL,ZESTRIL) 40 MG tablet Take 40 mg by mouth daily.       Magnesium Oxide 250 MG TABS Take 500 mg by mouth       METFORMIN HCL PO Take 500 mg by mouth 2 times daily (with meals)       methadone (DOLOPHINE) 5 MG tablet Take 1 tablet by mouth 3 times daily. Take about 8 hours apart       metoprolol (LOPRESSOR) 100 MG tablet Take 1 tablet by mouth daily.       modafinil (PROVIGIL) 200 MG tablet Take 200 mg by mouth daily       Pantoprazole Sodium (PROTONIX PO)        pregabalin (LYRICA) 150 MG capsule Take 150 mg by mouth 2 times daily       SUMAtriptan (IMITREX) 25 MG tablet Take 25 mg by mouth at onset of headache for migraine       aluminum chloride (DRYSOL) 20 % external solution Apply nightly to sweaty areas (breast, arm pits, groin folds) and wash area in morning. As sweating improves, decrease use to 1-2  times weekly. (Patient not taking: Reported on 8/23/2018) 60 mL 3     aspirin 81 MG EC tablet Take 81 mg by mouth daily.       chlorhexidine (HIBICLENS) 4 % liquid Wash affected areas 3x weekly. 236 mL 11     clindamycin (CLEOCIN T) 1 % external lotion Apply twice daily to sites of flaring on the body. 60 mL 11     cyclobenzaprine (FLEXERIL) 10 MG tablet Take 1 tablet by mouth nightly as needed.       FERROUS GLUCONATE PO Take 324 mg by mouth Pt. Unsure of dose       fluocin-hydroquinone-tretinoin 0.01-4-0.05 % CREA Externally apply topically At Bedtime (Patient not taking: Reported on 8/23/2018) 30 g 3     hydrOXYzine (VISTARIL) 25 MG capsule Take 1-2 capsules by mouth every 6 hours as needed. For nausea, anxiety, pain       LANsoprazole (PREVACID SOLUTAB) 30 MG disintegrating tablet Take 30 mg by mouth daily.       latanoprost (XALATAN) 0.005 % ophthalmic solution Place 1 drop into both eyes At Bedtime.       Pregabalin (LYRICA) 200 MG CAPS Take 1 capsule by mouth 3 times daily.       sertraline (ZOLOFT) 50 MG tablet Take 50 mg by mouth daily       SUMAtriptan (IMITREX) 50 MG tablet Take 1 tablet by mouth at onset of headache. May take 1 tab every 4 hours prn       zolpidem (AMBIEN) 10 MG tablet Take 10 mg by mouth nightly as needed.         No Known Allergies    Review of Systems:  -Constitutional: Otherwise feeling well today, in usual state of health.  -HEENT: Patient denies nonhealing oral sores.  -Skin: As above in HPI. No additional skin concerns.    Physical exam:  Vitals: There were no vitals taken for this visit.  GEN: This is a well developed, well-nourished female in no acute distress, in a pleasant mood.    SKIN: Waist-up skin, which includes the head/face, neck, both arms, chest, back, abdomen, digits and/or nails was examined.  -Leblanc skin type: II  There are 2 hyperpigmented scars with no active erythema or fluctuance. The larger lesion is in the L inframammary fold measuring 7mm, and the  smaller is in her R inframammary fold measuring 3mm  -No other lesions of concern on areas examined.       Impression/Plan:  #. Hidradenitis suppurativa, Glass stage 1.  Inframammary, groin and lower abdominal involvement previously. Not active on exam today.    Continue doxycycline 50 mg daily; hope to ultimately maintain on sub antimicrobial dosing. We again reviewed that this can cause stomach upset, and that in comparison to minocycline there is a risk of sun sensitivity.     Continue clindamycin lotion b.i.d. and hibiclens to affected sites/body areas as needed for active disease     Discussed that when flareups occur, she could consider seeking care at her local clinic in [place] for potential injection of Kenalog 10 mg/mL into the site to reduce inflammation.  She requested that I copy this note to her primary in [place] for consideration.      #. EIC, L inframammary fold  -She politely declined surgical intervention today; if becomes inflamed, I can consider Kenalog, and then would consider an excision of the inflammation is resolved for definitive treatment    Follow-up in 6 months, earlier for new or changing lesions.     Staff Involved:    Asif Leung MD  Staff Dermatologist and Dermatopathologist  , Department of Dermatology    On close of this encounter, please copy this note to Radha Morales at [facility].

## 2021-05-12 NOTE — PROGRESS NOTES
"[facility] Dermatology Note      Dermatology Problem List:  #. Hidradenitis suppurativa, mild:   - Current doxycycline 50 mg daily, hibiclens wash and clinda lotion.     - Prior treatments:  isotretinoin at outside clinic (elevated triglycerides and dry eyes). Previously well controlled on minocycline 50 mg b.i.d., BP wash. Recent smoking cessation.   #.  History of eczema.   #.  Corn, right thumb, status post paring 02/23/2017.   #.  Melasma, face.  Sun protection and (hydrocortisone 1%, adapalene gel, and OTC 2% hydroquinone)   #. EIC L inframammary - monitor, offered surgical intervention but politely declined    Encounter Date: Apr 20, 2021    CC:  Chief Complaint   Patient presents with     Derm Problem     Patient is here for her regular follow up.         History of Present Illness:  Ms. Gertrude Dyson is a 55 year old female who presents as a follow-up for hidradenitis suppurativa.     She was last seen in October 2019, before the coronavirus pandemic.  Since that time, she notes 2 notable flareups, where cysts or abscesses \"filled up\" -in one case, one lesion did not drain out, and the other drained a small amount.  She notes this is significantly better than prior to her initial presentation.  She is currently on doxycycline 50 mg daily, Hibiclens washes, advised 1% lotion twice daily to any inflamed areas.  She uses an SPF 30 or greater sunscreen due to the photosensitive potential doxycycline.    She notes a firm nodule under her left breast which is somewhat bothersome, but she does not think that she would want surgery and it was harmless.      Past Medical History:   Patient Active Problem List   Diagnosis     Idiopathic small fiber sensory neuropathy     No past medical history on file.  No past surgical history on file.    Social History:  Patient reports that she has been smoking. She has been smoking about 0.00 packs per day. She has never used smokeless tobacco.    Family " History:  No family history on file.    Medications:  Current Outpatient Medications   Medication Sig Dispense Refill     atorvastatin (LIPITOR) 20 MG tablet TAKE 1 TABLET BY MOUTH ONCE DAILY WITH EVENING MEAL.  0     azelastine (OPTIVAR) 0.05 % ophthalmic solution INSTILL 1 DROP INTO AFFECTED EYE TWICE A DAY  5     calcium carbonate (TUMS) 500 MG chewable tablet Take 1 chew tab by mouth 2 times daily       cycloSPORINE (RESTASIS) 0.05 % ophthalmic emulsion 1 drop 2 times daily       doxycycline monohydrate (MONODOX) 50 MG capsule Take 1 capsule (50 mg) by mouth daily 90 capsule 3     DULoxetine HCl (CYMBALTA PO)        fish oil-omega-3 fatty acids 1000 MG capsule Take 2 g by mouth daily       hydrochlorothiazide (HYDRODIURIL) 25 MG tablet Take 25 mg by mouth daily.       lisinopril (PRINIVIL,ZESTRIL) 40 MG tablet Take 40 mg by mouth daily.       Magnesium Oxide 250 MG TABS Take 500 mg by mouth       METFORMIN HCL PO Take 500 mg by mouth 2 times daily (with meals)       methadone (DOLOPHINE) 5 MG tablet Take 1 tablet by mouth 3 times daily. Take about 8 hours apart       metoprolol (LOPRESSOR) 100 MG tablet Take 1 tablet by mouth daily.       modafinil (PROVIGIL) 200 MG tablet Take 200 mg by mouth daily       Pantoprazole Sodium (PROTONIX PO)        pregabalin (LYRICA) 150 MG capsule Take 150 mg by mouth 2 times daily       SUMAtriptan (IMITREX) 25 MG tablet Take 25 mg by mouth at onset of headache for migraine       aluminum chloride (DRYSOL) 20 % external solution Apply nightly to sweaty areas (breast, arm pits, groin folds) and wash area in morning. As sweating improves, decrease use to 1-2 times weekly. (Patient not taking: Reported on 8/23/2018) 60 mL 3     aspirin 81 MG EC tablet Take 81 mg by mouth daily.       chlorhexidine (HIBICLENS) 4 % liquid Wash affected areas 3x weekly. 236 mL 11     clindamycin (CLEOCIN T) 1 % external lotion Apply twice daily to sites of flaring on the body. 60 mL 11      cyclobenzaprine (FLEXERIL) 10 MG tablet Take 1 tablet by mouth nightly as needed.       FERROUS GLUCONATE PO Take 324 mg by mouth Pt. Unsure of dose       fluocin-hydroquinone-tretinoin 0.01-4-0.05 % CREA Externally apply topically At Bedtime (Patient not taking: Reported on 8/23/2018) 30 g 3     hydrOXYzine (VISTARIL) 25 MG capsule Take 1-2 capsules by mouth every 6 hours as needed. For nausea, anxiety, pain       LANsoprazole (PREVACID SOLUTAB) 30 MG disintegrating tablet Take 30 mg by mouth daily.       latanoprost (XALATAN) 0.005 % ophthalmic solution Place 1 drop into both eyes At Bedtime.       Pregabalin (LYRICA) 200 MG CAPS Take 1 capsule by mouth 3 times daily.       sertraline (ZOLOFT) 50 MG tablet Take 50 mg by mouth daily       SUMAtriptan (IMITREX) 50 MG tablet Take 1 tablet by mouth at onset of headache. May take 1 tab every 4 hours prn       zolpidem (AMBIEN) 10 MG tablet Take 10 mg by mouth nightly as needed.         No Known Allergies    Review of Systems:  -Constitutional: Otherwise feeling well today, in usual state of health.  -HEENT: Patient denies nonhealing oral sores.  -Skin: As above in HPI. No additional skin concerns.    Physical exam:  Vitals: There were no vitals taken for this visit.  GEN: This is a well developed, well-nourished female in no acute distress, in a pleasant mood.    SKIN: Waist-up skin, which includes the head/face, neck, both arms, chest, back, abdomen, digits and/or nails was examined.  -Leblanc skin type: II  There are 2 hyperpigmented scars with no active erythema or fluctuance. The larger lesion is in the L inframammary fold measuring 7mm, and the smaller is in her R inframammary fold measuring 3mm  -No other lesions of concern on areas examined.       Impression/Plan:  #. Hidradenitis suppurativa, Glass stage 1.  Inframammary, groin and lower abdominal involvement previously. Not active on exam today.    Continue doxycycline 50 mg daily; hope to ultimately  maintain on sub antimicrobial dosing. We again reviewed that this can cause stomach upset, and that in comparison to minocycline there is a risk of sun sensitivity.     Continue clindamycin lotion b.i.d. and hibiclens to affected sites/body areas as needed for active disease     Discussed that when flareups occur, she could consider seeking care at her local clinic in [place] for potential injection of Kenalog 10 mg/mL into the site to reduce inflammation.  She requested that I copy this note to her primary in [place] for consideration.      #. EIC, L inframammary fold  -She politely declined surgical intervention today; if becomes inflamed, I can consider Kenalog, and then would consider an excision of the inflammation is resolved for definitive treatment    Follow-up in 6 months, earlier for new or changing lesions.     Staff Involved:    Asif Leung MD  Staff Dermatologist and Dermatopathologist  , Department of Dermatology    On close of this encounter, please copy this note to Radha Morales at [facility].

## 2021-10-02 ENCOUNTER — HEALTH MAINTENANCE LETTER (OUTPATIENT)
Age: 56
End: 2021-10-02

## 2021-11-27 ENCOUNTER — HEALTH MAINTENANCE LETTER (OUTPATIENT)
Age: 56
End: 2021-11-27

## 2022-04-07 DIAGNOSIS — L73.2 HIDRADENITIS SUPPURATIVA: ICD-10-CM

## 2022-04-08 RX ORDER — DOXYCYCLINE 50 MG/1
50 CAPSULE ORAL DAILY
Qty: 90 CAPSULE | Refills: 3 | Status: CANCELLED | OUTPATIENT
Start: 2022-04-08

## 2022-04-11 NOTE — TELEPHONE ENCOUNTER
Last Clinic Visit: 4/20/2021  [facility] [place]  Recommended 6 month follow up, no upcoming appointments  Refill declined per derm protocol process #4  Call to pharmacy after receiving paper refill request, message left of refill decline, needs to be seen in clinic for refills  Routed to clinic scheduling for follow up

## 2022-04-13 NOTE — TELEPHONE ENCOUNTER
[facility] Call Center    Phone Message    May a detailed message be left on voicemail: no     Reason for Call: Other: Pt, Gertrude / Scheduled Gertrude for 7/8/22 @ 11:30 in [place]/ Please Refill Medications as this was first available appt     Action Taken: Message routed to:  [facility]    Travel Screening: Not Applicable

## 2022-04-15 RX ORDER — DOXYCYCLINE 50 MG/1
50 CAPSULE ORAL 2 TIMES DAILY
Qty: 60 CAPSULE | Refills: 5 | Status: SHIPPED | OUTPATIENT
Start: 2022-04-15 | End: 2023-06-08

## 2022-05-14 ENCOUNTER — HEALTH MAINTENANCE LETTER (OUTPATIENT)
Age: 57
End: 2022-05-14

## 2022-07-07 NOTE — PROGRESS NOTES
[facility] D[facility]atology Clinic Note - EP    Encounter Date: Jul 8, 2022    Dermatology Problem List:  1. Hidradenitis suppurativa, mild:   - Current doxycycline 50 mg daily, hibiclens wash and clinda lotion.     - Prior treatments:  isotretinoin at outside clinic (elevated triglycerides and dry eyes). Previously well controlled on minocycline 50 mg b.i.d., BP wash. Recent smoking cessation.   2.  History of eczema.   3.  Corn, right thumb, status post paring 02/23/2017.   4.  Melasma, face.  Sun protection and (hydrocortisone 1%, adapalene gel, and OTC 2% hydroquinone)   5. EIC L inframammary - monitor, offered surgical intervention but politely declined  6. Persistent ruptured folliculitis, left cheek  - doxycycline 50 mg BID for two weeks    Social hx: Patient retired from [employer] in 9/1/2021.  ____________________________________________    Assessment & Plan:     #. Hidradenitis suppurativa, Glass stage 1.  Inframammary, groin and lower abdominal involvement previously. Not active on exam today.    Continue doxycycline 50 mg once daily; hope to ultimately maintain on sub antimicrobial dosing. Take with lots of water and stay sitting up for at least one hours after ingesting. We again reviewed that this can cause stomach upset, and that in comparison to minocycline there is a risk of sun sensitivity.  Patient denies sun sensitivity or heartburn on this medication.     Continue clindamycin lotion b.i.d. and hibiclens to affected sites/body areas as needed for active disease     Discussed that when flareups occur, she could consider seeking care at her local clinic in [place] for potential injection of Kenalog 10 mg/mL into the site to reduce inflammation.  She requested that I copy this note to her primary in [place] for consideration.       #. EIC, L inframammary fold  -She politely declined surgical intervention today; if becomes inflamed, we will consider Kenalog, and then would consider an excision of the  inflammation is resolved for definitive treatment  - No new cysts noted today.     #. Persistent ruptured folliculitis  Discussed etiology and benign nature of this condition.  - Start doxycycline 50 mg BID for two weeks, then taper back down to once daily.     Procedures Performed:   None.    Follow-up: 1 year or earlier if necessary.    Scribe Disclosure:  I, Kaleigh Mendez, am serving as a scribe to document services personally performed by Asif Leung MD based on data collection and the provider's statements to me.     Staff attestation:  The documentation recorded by the scribe accurately reflects the services I personally performed and the decisions I personally made. I have made edits where needed.    Asif Leung MD  Dermatology/Dermatopathology Staff Physician  , Department of Dermatology  ____________________________________________    CC: Derm Problem (Follow up Hidradenitis suppurativa )      HPI:  Ms. Gertrude Dyson is a(n) extremely pleasant 56 year old female who presents today as a return patient for follow up regarding her medications. The patient was last seen in dermatology on 4/20/21 by myself at which time the patient was advised to continue doxycycline 50 mg daily and clindamycin lotion twice a day.     Today, the patient reports a spot on her right cheek that does not change or bother her however it has been present for a month or two. She also notes that her HS has been under good control with doxycycline 50 mg once daily.     The patient denies any painful, bleeding, or nonhealing lesions, or any new or changing moles.    Patient is otherwise feeling well, without additional skin concerns.     Labs Reviewed:  N/A    Physical Exam:  Vitals: There were no vitals taken for this visit.  SKIN: Waist-up skin, which includes the head/face, neck, both arms, chest, back, abdomen, digits and/or nails was examined.  - Plugged hair follicle on left cheek.  - Soft  mobile smooth nodule on the left inframammary fold.  - No other lesions of concern on areas examined.     Medications:  Current Outpatient Medications   Medication     atorvastatin (LIPITOR) 20 MG tablet     azelastine (OPTIVAR) 0.05 % ophthalmic solution     calcium carbonate (TUMS) 500 MG chewable tablet     cycloSPORINE (RESTASIS) 0.05 % ophthalmic emulsion     doxycycline monohydrate (MONODOX) 50 MG capsule     DULoxetine HCl (CYMBALTA PO)     fish oil-omega-3 fatty acids 1000 MG capsule     hydrochlorothiazide (HYDRODIURIL) 25 MG tablet     lisinopril (PRINIVIL,ZESTRIL) 40 MG tablet     Magnesium Oxide 250 MG TABS     METFORMIN HCL PO     methadone (DOLOPHINE) 5 MG tablet     metoprolol (LOPRESSOR) 100 MG tablet     modafinil (PROVIGIL) 200 MG tablet     Pantoprazole Sodium (PROTONIX PO)     pregabalin (LYRICA) 150 MG capsule     SUMAtriptan (IMITREX) 25 MG tablet     doxycycline monohydrate (MONODOX) 50 MG capsule     FERROUS GLUCONATE PO     Pregabalin (LYRICA) 200 MG CAPS     SUMAtriptan (IMITREX) 50 MG tablet     No current facility-administered medications for this visit.      Past Medical History:   Patient Active Problem List   Diagnosis     Idiopathic small fiber sensory neuropathy     No past medical history on file.    CC Asif Leung MD  [address] [place] [phone] on close of this encounter.    This note has been created using voice recognition software; while it has been reviewed, some errors may persist.

## 2022-07-08 ENCOUNTER — OFFICE VISIT (OUTPATIENT)
Dept: FAMILY MEDICINE | Facility: CLINIC | Age: 57
End: 2022-07-08
Payer: COMMERCIAL

## 2022-07-08 DIAGNOSIS — L73.2 HIDRADENITIS SUPPURATIVA: Primary | ICD-10-CM

## 2022-07-08 DIAGNOSIS — L72.0 EPIDERMAL INCLUSION CYST: ICD-10-CM

## 2022-07-08 DIAGNOSIS — L73.9 FOLLICULITIS: ICD-10-CM

## 2022-07-08 PROCEDURE — 99214 OFFICE O/P EST MOD 30 MIN: CPT | Performed by: DERMATOLOGY

## 2022-07-08 ASSESSMENT — PAIN SCALES - GENERAL: PAINLEVEL: NO PAIN (0)

## 2022-09-03 ENCOUNTER — HEALTH MAINTENANCE LETTER (OUTPATIENT)
Age: 57
End: 2022-09-03

## 2023-04-02 DIAGNOSIS — L73.2 HIDRADENITIS SUPPURATIVA: ICD-10-CM

## 2023-05-02 RX ORDER — DOXYCYCLINE 50 MG/1
CAPSULE ORAL
Qty: 60 CAPSULE | Refills: 5 | OUTPATIENT
Start: 2023-05-02

## 2023-06-03 ENCOUNTER — HEALTH MAINTENANCE LETTER (OUTPATIENT)
Age: 58
End: 2023-06-03

## 2023-06-08 DIAGNOSIS — L73.2 HIDRADENITIS SUPPURATIVA: ICD-10-CM

## 2023-06-08 RX ORDER — DOXYCYCLINE 50 MG/1
CAPSULE ORAL
Qty: 60 CAPSULE | Refills: 6 | Status: SHIPPED | OUTPATIENT
Start: 2023-06-08 | End: 2023-10-30

## 2023-06-08 NOTE — TELEPHONE ENCOUNTER
Received refill request for doxycycline 50mg BID as the resident on call. Reviewed patient's chart and attached communication. Patient last seen 7/2022 for HS. RTC scheduled for 11/2023.     After reviewing the medication list and assessment and plan from last visit, the refill request was accepted.    CC'ing Dr. Leung as NAIF Johns MD  Med/Derm PGY-5  P: [phone]

## 2023-06-08 NOTE — TELEPHONE ENCOUNTER
Routing refill request to provider for review/approval because:  Drug not on the [facility] refill protocol     Kayce HARPER RN  [facility] [place]  [phone]

## 2023-06-08 NOTE — TELEPHONE ENCOUNTER
Pt called and would like for us to send in a new rx for this medication. Pt said it has  and the pharm will need a new one. Pt will be going out of town in a couple of hours and will not have enough to last her. Pt would like for you to send it to the pharm below. If you have any questions, please call the pt back. Thanks    [id number] [place] [address]

## 2023-10-30 ENCOUNTER — OFFICE VISIT (OUTPATIENT)
Dept: FAMILY MEDICINE | Facility: CLINIC | Age: 58
End: 2023-10-30
Payer: COMMERCIAL

## 2023-10-30 DIAGNOSIS — L73.2 HIDRADENITIS SUPPURATIVA: Primary | ICD-10-CM

## 2023-10-30 DIAGNOSIS — D23.9 DILATED PORE OF WINER: ICD-10-CM

## 2023-10-30 PROCEDURE — 99213 OFFICE O/P EST LOW 20 MIN: CPT | Performed by: NURSE PRACTITIONER

## 2023-10-30 RX ORDER — METHADONE HYDROCHLORIDE 10 MG/1
10 TABLET ORAL 3 TIMES DAILY
COMMUNITY

## 2023-10-30 RX ORDER — DOXYCYCLINE 40 MG/1
40 CAPSULE ORAL DAILY
Qty: 90 CAPSULE | Refills: 3 | Status: SHIPPED | OUTPATIENT
Start: 2023-10-30 | End: 2023-11-08

## 2023-10-30 RX ORDER — DULOXETIN HYDROCHLORIDE 20 MG/1
CAPSULE, DELAYED RELEASE ORAL
COMMUNITY
Start: 2023-10-10

## 2023-10-30 ASSESSMENT — PAIN SCALES - GENERAL: PAINLEVEL: NO PAIN (0)

## 2023-10-30 NOTE — PROGRESS NOTES
[facility] Dermatology Note  Encounter Date: Oct 30, 2023  Office Visit     Reviewed patients past medical history and pertinent chart review prior to patients visit today.     Dermatology Problem List:  Hidradenitis suppurativa, glass stage 1.   Current treatment: doxycycline 50 mg well controlled, trial of decreasing to 40 mg daily 10/30/2023   Previous treatments: hibiclens wash and clinda lotion, isotretinoin at outside clinic (elevated triglycerides and dry eyes). Previously well controlled on minocycline 50 mg b.i.d., BP wash.   #.  History of eczema.   #.  Corn, right thumb, status post paring 02/23/2017.   #.  Melasma, face.  Sun protection and (hydrocortisone 1%, adapalene gel, and OTC 2% hydroquinone)   #. EIC L inframammary - monitor, offered surgical intervention but politely declined     ____________________________________________    Assessment & Plan:     #. Hidradenitis suppurativa, Glass stage 1.  Inframammary, groin and lower abdominal involvement previously. Not active on exam today.  Decrease to doxycycline 40 mg daily. She can increase to 50 mg BID with flares only. We again reviewed that this can cause stomach upset, and that in comparison to minocycline there is a risk of sun sensitivity.   Patient has not flared in 2+ years and has stopped topicals. If flares start clindamycin lotion b.i.d. and hibiclens to affected sites/body areas as needed for active disease   Discussed that when flareups occur, she could consider seeking care at her local clinic in [place] or for potential injection of Kenalog 10 mg/mL into the site to reduce inflammation.  She requested that I copy this note to her primary in [place] for consideration.      #Dilated pore of Stuart, benign no treatment needed    Kendal Johnson CNP  Dermatology    _______________________________________    CC: Skin Check (FBSE)    HPI:  Ms. Gertrude Dyson is a(n) 58 year old female who presents today for follow-up   for hidradenitis. She has been on doxycycline 50 mg once daily for the past 1 year and higher dosage the year before. She is very happy as she has not broken out with any cysts in the last 2 years.  She has stopped her topical clindamycin and Hibiclens and is only taking that doxycycline 50 mg daily.  She is interested in continuing.    Patient is otherwise feeling well, without additional skin concerns.      Physical Exam:  SKIN: Focused examination of axilla, left thigh, and abdomen was performed.  -1 area of scar with 3 open comedones on the right axilla  -Left superior thigh open dilated pore with poor contents within  -No scarring or postinflammatory erythema on the breast or inframammary folds    - No other lesions of concern on areas examined.     Medications:  Current Outpatient Medications   Medication    atorvastatin (LIPITOR) 20 MG tablet    azelastine (OPTIVAR) 0.05 % ophthalmic solution    cycloSPORINE (RESTASIS) 0.05 % ophthalmic emulsion    doxycycline monohydrate (MONODOX) 50 MG capsule    DULoxetine (CYMBALTA) 20 MG capsule    DULoxetine HCl (CYMBALTA PO)    fish oil-omega-3 fatty acids 1000 MG capsule    hydrochlorothiazide (HYDRODIURIL) 25 MG tablet    lisinopril (PRINIVIL,ZESTRIL) 40 MG tablet    Magnesium Oxide 250 MG TABS    METFORMIN HCL PO    methadone (DOLOPHINE) 10 MG tablet    metoprolol (LOPRESSOR) 100 MG tablet    modafinil (PROVIGIL) 200 MG tablet    Pantoprazole Sodium (PROTONIX PO)    pregabalin (LYRICA) 150 MG capsule    SUMAtriptan (IMITREX) 25 MG tablet    calcium carbonate (TUMS) 500 MG chewable tablet    doxycycline monohydrate (MONODOX) 50 MG capsule    FERROUS GLUCONATE PO    methadone (DOLOPHINE) 5 MG tablet    Pregabalin (LYRICA) 200 MG CAPS    SUMAtriptan (IMITREX) 50 MG tablet     No current facility-administered medications for this visit.      Past Medical History:   Patient Active Problem List   Diagnosis    Idiopathic small fiber sensory neuropathy     History  reviewed. No pertinent past medical history.    CC Referred Self, MD  No address on file on close of this encounter.

## 2023-10-30 NOTE — LETTER
10/30/2023         RE: Gertrude Dyson  [address] [id number]        Dear Colleague,    Thank you for referring your patient, Gertrude Dyson, to the [facility]. Please see a copy of my visit note below.    [facility] Dermatology Note  Encounter Date: Oct 30, 2023  Office Visit     Reviewed patients past medical history and pertinent chart review prior to patients visit today.     Dermatology Problem List:  Hidradenitis suppurativa, glass stage 1.   Current treatment: doxycycline 50 mg well controlled, trial of decreasing to 40 mg daily 10/30/2023   Previous treatments: hibiclens wash and clinda lotion, isotretinoin at outside clinic (elevated triglycerides and dry eyes). Previously well controlled on minocycline 50 mg b.i.d., BP wash.   #.  History of eczema.   #.  Corn, right thumb, status post paring 02/23/2017.   #.  Melasma, face.  Sun protection and (hydrocortisone 1%, adapalene gel, and OTC 2% hydroquinone)   #. EIC L inframammary - monitor, offered surgical intervention but politely declined     ____________________________________________    Assessment & Plan:     #. Hidradenitis suppurativa, Glass stage 1.  Inframammary, groin and lower abdominal involvement previously. Not active on exam today.  Decrease to doxycycline 40 mg daily. She can increase to 50 mg BID with flares only. We again reviewed that this can cause stomach upset, and that in comparison to minocycline there is a risk of sun sensitivity.   Patient has not flared in 2+ years and has stopped topicals. If flares start clindamycin lotion b.i.d. and hibiclens to affected sites/body areas as needed for active disease   Discussed that when flareups occur, she could consider seeking care at her local clinic in [place] or for potential injection of Kenalog 10 mg/mL into the site to reduce inflammation.  She requested that I copy this note to her primary in [place]  for consideration.      #Dilated pore of Stuart, benign no treatment needed    Kendal Johnson CNP  Dermatology    _______________________________________    CC: Skin Check (FBSE)    HPI:  Ms. Gertrude Dyson is a(n) 58 year old female who presents today for follow-up  for hidradenitis. She has been on doxycycline 50 mg once daily for the past 1 year and higher dosage the year before. She is very happy as she has not broken out with any cysts in the last 2 years.  She has stopped her topical clindamycin and Hibiclens and is only taking that doxycycline 50 mg daily.  She is interested in continuing.    Patient is otherwise feeling well, without additional skin concerns.      Physical Exam:  SKIN: Focused examination of axilla, left thigh, and abdomen was performed.  -1 area of scar with 3 open comedones on the right axilla  -Left superior thigh open dilated pore with poor contents within  -No scarring or postinflammatory erythema on the breast or inframammary folds    - No other lesions of concern on areas examined.     Medications:  Current Outpatient Medications   Medication     atorvastatin (LIPITOR) 20 MG tablet     azelastine (OPTIVAR) 0.05 % ophthalmic solution     cycloSPORINE (RESTASIS) 0.05 % ophthalmic emulsion     doxycycline monohydrate (MONODOX) 50 MG capsule     DULoxetine (CYMBALTA) 20 MG capsule     DULoxetine HCl (CYMBALTA PO)     fish oil-omega-3 fatty acids 1000 MG capsule     hydrochlorothiazide (HYDRODIURIL) 25 MG tablet     lisinopril (PRINIVIL,ZESTRIL) 40 MG tablet     Magnesium Oxide 250 MG TABS     METFORMIN HCL PO     methadone (DOLOPHINE) 10 MG tablet     metoprolol (LOPRESSOR) 100 MG tablet     modafinil (PROVIGIL) 200 MG tablet     Pantoprazole Sodium (PROTONIX PO)     pregabalin (LYRICA) 150 MG capsule     SUMAtriptan (IMITREX) 25 MG tablet     calcium carbonate (TUMS) 500 MG chewable tablet     doxycycline monohydrate (MONODOX) 50 MG capsule     FERROUS GLUCONATE PO     methadone  (DOLOPHINE) 5 MG tablet     Pregabalin (LYRICA) 200 MG CAPS     SUMAtriptan (IMITREX) 50 MG tablet     No current facility-administered medications for this visit.      Past Medical History:   Patient Active Problem List   Diagnosis     Idiopathic small fiber sensory neuropathy     History reviewed. No pertinent past medical history.    CC Referred Self, MD  No address on file on close of this encounter.       Again, thank you for allowing me to participate in the care of your patient.        Sincerely,        MINH Lua CNP

## 2023-10-30 NOTE — PATIENT INSTRUCTIONS
Patient Education       Proper skin care from [facility]:    -Eliminate harsh soaps as they strip the natural oils from the skin, often resulting in dry itchy skin ( i.e. Dial, Zest, Latv[place] Spring)  -Use mild soaps such as Cetaphil or Dove Sensitive Skin in the shower. You do not need to use soap on arms, legs, and trunk every time you shower unless visibly soiled.   -Avoid hot or cold showers.  -After showering, lightly dry off and apply moisturizing within 2-3 minutes. This will help trap moisture in the skin.   -Aggressive use of a moisturizer at least 1-2 times a day to the entire body (including -Vanicream, Cetaphil, Aquaphor or Cerave) and moisturize hands after every washing.  -We recommend using moisturizers that come in a tub that needs to be scooped out, not a pump. This has more of an oil base. It will hold moisture in your skin much better than a water base moisturizer. The above recommended are non-pore clogging.      Wear a sunscreen with at least SPF 30 on your face, ears, neck and V of the chest daily. Wear sunscreen on other areas of the body if those areas are exposed to the sun throughout the day. Sunscreens can contain physical and/or chemical blockers. Physical blockers are less likely to clog pores, these include zinc oxide and titanium dioxide. Reapply every two hour and after swimming.     Sunscreen examples: https://www.ewg.org/sunscreen/    UV radiation  UVA radiation remains constant throughout the day and throughout the year. It is a longer wavelength than UVB and therefore penetrates deeper into the skin leading to immediate and delayed tanning, photoaging, and skin cancer. 70-80% of UVA and UVB radiation occurs between the hours of 10am-2pm.  UVB radiation  UVB radiation causes the most harmful effects and is more significant during the summer months. However, snow and ice can reflect UVB radiation leading to skin damage during the winter months as well. UVB radiation is  responsible for tanning, burning, inflammation, delayed erythema (pinkness), pigmentation (brown spots), and skin cancer.     I recommend self monthly full body exams and yearly full body exams with a dermatology provider. If you develop a new or changing lesion please follow up for examination. Most skin cancers are pink and scaly or pink and pearly. However, we do see blue/brown/black skin cancers.  Consider the ABCDEs of melanoma when giving yourself your monthly full body exam ( don't forget the groin, buttocks, feet, toes, etc). A-asymmetry, B-borders, C-color, D-diameter, E-elevation or evolving. If you see any of these changes please follow up in clinic. If you cannot see your back I recommend purchasing a hand held mirror to use with a larger wall mirror.       Checking for Skin Cancer  You can find cancer early by checking your skin each month. There are 3 kinds of skin cancer. They are melanoma, basal cell carcinoma, and squamous cell carcinoma. Doing monthly skin checks is the best way to find new marks or skin changes. Follow the instructions below for checking your skin.   The ABCDEs of checking moles for melanoma   Check your moles or growths for signs of melanoma using ABCDE:   Asymmetry: the sides of the mole or growth don t match  Border: the edges are ragged, notched, or blurred  Color: the color within the mole or growth varies  Diameter: the mole or growth is larger than 6 mm (size of a pencil eraser)  Evolving: the size, shape, or color of the mole or growth is changing (evolving is not shown in the images below)    Checking for other types of skin cancer  Basal cell carcinoma or squamous cell carcinoma have symptoms such as:     A spot or mole that looks different from all other marks on your skin  Changes in how an area feels, such as itching, tenderness, or pain  Changes in the skin's surface, such as oozing, bleeding, or scaliness  A sore that does not heal  New swelling or redness beyond  the border of a mole    Who s at risk?  Anyone can get skin cancer. But you are at greater risk if you have:   Fair skin, light-colored hair, or light-colored eyes  Many moles or abnormal moles on your skin  A history of sunburns from sunlight or tanning beds  A family history of skin cancer  A history of exposure to radiation or chemicals  A weakened immune system  If you have had skin cancer in the past, you are at risk for recurring skin cancer.   How to check your skin  Do your monthly skin checkups in front of a full-length mirror. Check all parts of your body, including your:   Head (ears, face, neck, and scalp)  Torso (front, back, and sides)  Arms (tops, undersides, upper, and lower armpits)  Hands (palms, backs, and fingers, including under the nails)  Buttocks and genitals  Legs (front, back, and sides)  Feet (tops, soles, toes, including under the nails, and between toes)  If you have a lot of moles, take digital photos of them each month. Make sure to take photos both up close and from a distance. These can help you see if any moles change over time.   Most skin changes are not cancer. But if you see any changes in your skin, call your doctor right away. Only he or she can diagnose a problem. If you have skin cancer, seeing your doctor can be the first step toward getting the treatment that could save your life.   [employer] last reviewed this educational content on 4/1/2019 2000-2020 [employer]. [address] [place] [address], [place] [address]. All rights reserved. This information is not intended as a substitute for professional medical care. Always follow your healthcare professional's instructions.       When should I call my doctor?  If you are worsening or not improving, please, contact us or seek urgent care as noted below.     Who should I call with questions (adults)?  [facility] (adult and pediatric): [phone]  [facility]  [place] (adult): [phone]  [facility] ([place], [place] and [place]) [phone]  For urgent needs outside of business hours call the [facility] at [phone] and ask for the dermatology resident on call to be paged  If this is a medical emergency and you are unable to reach an ER, Call 911      If you need a prescription refill, please contact your pharmacy. Refills are approved or denied by our Physicians during normal business hours, Monday through Fridays  Per office policy, refills will not be granted if you have not been seen within the past year (or sooner depending on your child's condition)

## 2023-11-07 ENCOUNTER — TELEPHONE (OUTPATIENT)
Dept: FAMILY MEDICINE | Facility: CLINIC | Age: 58
End: 2023-11-07
Payer: COMMERCIAL

## 2023-11-07 DIAGNOSIS — L73.2 HIDRADENITIS SUPPURATIVA: Primary | ICD-10-CM

## 2023-11-07 NOTE — TELEPHONE ENCOUNTER
[facility] Call Center    Phone Message    May a detailed message be left on voicemail: no     Reason for Call: Medication Question or concern regarding medication   Prescription Clarification  Name of Medication: doxycycline ROSACEA (ORACEA) 40 MG DR capsule   Prescribing Provider:     Skylar Johnson APRN CNP      Pharmacy: [place] [id number] [place] [address]    What on the order needs clarification? Pharmacist states the doxycycline ROSACEA (ORACEA) 40 MG DR capsule not covered by insurance but the 50MG is.       Action Taken: Other: [facility] Skin    Travel Screening: Not Applicable

## 2023-11-08 RX ORDER — DOXYCYCLINE 50 MG/1
50 CAPSULE ORAL DAILY
Qty: 90 CAPSULE | Refills: 3 | Status: SHIPPED | OUTPATIENT
Start: 2023-11-08 | End: 2024-03-12

## 2023-11-08 NOTE — TELEPHONE ENCOUNTER
Detailed message left with info from provider and return number [phone] to call with any questions or concerns.    Kayce HARPER RN  [facility] [place]  [phone]

## 2024-03-12 ENCOUNTER — OFFICE VISIT (OUTPATIENT)
Dept: FAMILY MEDICINE | Facility: CLINIC | Age: 59
End: 2024-03-12
Payer: COMMERCIAL

## 2024-03-12 DIAGNOSIS — D23.9 DILATED PORE OF WINER: Primary | ICD-10-CM

## 2024-03-12 DIAGNOSIS — L73.2 HIDRADENITIS SUPPURATIVA: ICD-10-CM

## 2024-03-12 PROCEDURE — 99213 OFFICE O/P EST LOW 20 MIN: CPT | Performed by: NURSE PRACTITIONER

## 2024-03-12 RX ORDER — DOXYCYCLINE 50 MG/1
50 CAPSULE ORAL DAILY
Qty: 90 CAPSULE | Refills: 3 | Status: SHIPPED | OUTPATIENT
Start: 2024-03-12

## 2024-03-12 ASSESSMENT — PAIN SCALES - GENERAL: PAINLEVEL: NO PAIN (0)

## 2024-03-12 NOTE — PROGRESS NOTES
[facility] Dermatology Note  Encounter Date: Mar 12, 2024  Office Visit     Reviewed patients past medical history and pertinent chart review prior to patients visit today.     Dermatology Problem List:  Hidradenitis suppurativa, glass stage 1.   Current treatment: doxycycline 50 mg well controlled,  40 mg Oracea was denied by insurance. Trial decreasing to 50 mg every other day and will discontinue if no flares.  Previous treatments: hibiclens wash and clinda lotion, isotretinoin at outside clinic (elevated triglycerides and dry eyes). Previously well controlled on minocycline 50 mg b.i.d., BP wash.   #.  History of eczema.   #.  Corn, right thumb, status post paring 02/23/2017.   #.  Melasma, face.  Sun protection and (hydrocortisone 1%, adapalene gel, and OTC 2% hydroquinone)   #. EIC L inframammary - monitor, offered surgical intervention but politely declined     ____________________________________________    Assessment & Plan:     #. Hidradenitis suppurativa, Glass stage 1.  Inframammary, groin and lower abdominal involvement previously. Not active on exam today and patient states no activity for the last 2 to 3 years  Decrease to doxycycline 50 mg every other day.  She will do this for at least 2 months and then if she would like to do a trial of going off of the doxycycline she can do this.  If needed she will increase back to 50 mg twice a day with a flare and 50 mg daily for maintenance.  Patient has not flared in 2+ years and has stopped topicals.  Patient declines any topical medications saying that these were not helpful in the past and does not want to try them again.    Discussed that when flareups occur, she could consider seeking care at her local clinic in [place] or for potential injection of Kenalog 10 mg/mL into the site to reduce inflammation.  She requested that I copy this note to her primary in [place] for consideration.      #Dilated pore of Stuart, left anterior  thigh.  Benign no treatment needed    Kendal Johnson CNP  Dermatology    _______________________________________    CC: RECHECK (6 month return Hidradenitis suppurativa/Check left thigh//)    HPI:  Ms. Gertrude Dyson is a(n) 58 year old female who presents today for follow-up  for hidradenitis. She has been on doxycycline 50 mg once daily for the past 1.5 years and higher dosage the year before. She is very happy as she has not broken out with any cysts in the last 2 years.  She has stopped her topical clindamycin and Hibiclens and is only taking that doxycycline 50 mg daily.  She is interested in continuing.  She states that she was broken out with cysts for about 1.5 years but ever since they stopped when she was on minocycline and now doxycycline she has not gotten any signs of an outbreak.  She wonders if she would still have breakouts if she stopped the medications.  She is not having any side effects from medication.    She is moving to [place] at the beginning of April and will seek further dermatology care after moving.    Patient is otherwise feeling well, without additional skin concerns.      Physical Exam:  SKIN: Focused examination of breasts, axilla, left thigh, and abdomen was performed.  -1 area of scar on the left breast with no active open or close comedones or inflammatory papules or pustules  -Left superior thigh open dilated pore with poor contents within  -No scarring or postinflammatory erythema on the breast or inframammary folds    - No other lesions of concern on areas examined.     Medications:  Current Outpatient Medications   Medication    atorvastatin (LIPITOR) 20 MG tablet    azelastine (OPTIVAR) 0.05 % ophthalmic solution    cycloSPORINE (RESTASIS) 0.05 % ophthalmic emulsion    doxycycline monohydrate (MONODOX) 50 MG capsule    DULoxetine (CYMBALTA) 20 MG capsule    DULoxetine HCl (CYMBALTA PO)    fish oil-omega-3 fatty acids 1000 MG capsule    hydrochlorothiazide (HYDRODIURIL) 25 MG  tablet    lisinopril (PRINIVIL,ZESTRIL) 40 MG tablet    Magnesium Oxide 250 MG TABS    METFORMIN HCL PO    methadone (DOLOPHINE) 10 MG tablet    metoprolol (LOPRESSOR) 100 MG tablet    modafinil (PROVIGIL) 200 MG tablet    Pantoprazole Sodium (PROTONIX PO)    pregabalin (LYRICA) 150 MG capsule    SUMAtriptan (IMITREX) 25 MG tablet    calcium carbonate (TUMS) 500 MG chewable tablet    FERROUS GLUCONATE PO    methadone (DOLOPHINE) 5 MG tablet    Pregabalin (LYRICA) 200 MG CAPS    SUMAtriptan (IMITREX) 50 MG tablet     No current facility-administered medications for this visit.      Past Medical History:   Patient Active Problem List   Diagnosis    Idiopathic small fiber sensory neuropathy     History reviewed. No pertinent past medical history.    CC Referred MD Navin  No address on file on close of this encounter.

## 2024-03-12 NOTE — PATIENT INSTRUCTIONS
Patient Education       Proper skin care from [facility]:    -Eliminate harsh soaps as they strip the natural oils from the skin, often resulting in dry itchy skin ( i.e. Dial, Zest, Sinhala Spring)  -Use mild soaps such as Cetaphil or Dove Sensitive Skin in the shower. You do not need to use soap on arms, legs, and trunk every time you shower unless visibly soiled.   -Avoid hot or cold showers.  -After showering, lightly dry off and apply moisturizing within 2-3 minutes. This will help trap moisture in the skin.   -Aggressive use of a moisturizer at least 1-2 times a day to the entire body (including -Vanicream, Cetaphil, Aquaphor or Cerave) and moisturize hands after every washing.  -We recommend using moisturizers that come in a tub that needs to be scooped out, not a pump. This has more of an oil base. It will hold moisture in your skin much better than a water base moisturizer. The above recommended are non-pore clogging.      Wear a sunscreen with at least SPF 30 on your face, ears, neck and V of the chest daily. Wear sunscreen on other areas of the body if those areas are exposed to the sun throughout the day. Sunscreens can contain physical and/or chemical blockers. Physical blockers are less likely to clog pores, these include zinc oxide and titanium dioxide. Reapply every two hour and after swimming.     Sunscreen examples: https://www.ewg.org/sunscreen/    UV radiation  UVA radiation remains constant throughout the day and throughout the year. It is a longer wavelength than UVB and therefore penetrates deeper into the skin leading to immediate and delayed tanning, photoaging, and skin cancer. 70-80% of UVA and UVB radiation occurs between the hours of 10am-2pm.  UVB radiation  UVB radiation causes the most harmful effects and is more significant during the summer months. However, snow and ice can reflect UVB radiation leading to skin damage during the winter months as well. UVB radiation is  responsible for tanning, burning, inflammation, delayed erythema (pinkness), pigmentation (brown spots), and skin cancer.     I recommend self monthly full body exams and yearly full body exams with a dermatology provider. If you develop a new or changing lesion please follow up for examination. Most skin cancers are pink and scaly or pink and pearly. However, we do see blue/brown/black skin cancers.  Consider the ABCDEs of melanoma when giving yourself your monthly full body exam ( don't forget the groin, buttocks, feet, toes, etc). A-asymmetry, B-borders, C-color, D-diameter, E-elevation or evolving. If you see any of these changes please follow up in clinic. If you cannot see your back I recommend purchasing a hand held mirror to use with a larger wall mirror.       Checking for Skin Cancer  You can find cancer early by checking your skin each month. There are 3 kinds of skin cancer. They are melanoma, basal cell carcinoma, and squamous cell carcinoma. Doing monthly skin checks is the best way to find new marks or skin changes. Follow the instructions below for checking your skin.   The ABCDEs of checking moles for melanoma   Check your moles or growths for signs of melanoma using ABCDE:   Asymmetry: the sides of the mole or growth don t match  Border: the edges are ragged, notched, or blurred  Color: the color within the mole or growth varies  Diameter: the mole or growth is larger than 6 mm (size of a pencil eraser)  Evolving: the size, shape, or color of the mole or growth is changing (evolving is not shown in the images below)    Checking for other types of skin cancer  Basal cell carcinoma or squamous cell carcinoma have symptoms such as:     A spot or mole that looks different from all other marks on your skin  Changes in how an area feels, such as itching, tenderness, or pain  Changes in the skin's surface, such as oozing, bleeding, or scaliness  A sore that does not heal  New swelling or redness beyond  the border of a mole    Who s at risk?  Anyone can get skin cancer. But you are at greater risk if you have:   Fair skin, light-colored hair, or light-colored eyes  Many moles or abnormal moles on your skin  A history of sunburns from sunlight or tanning beds  A family history of skin cancer  A history of exposure to radiation or chemicals  A weakened immune system  If you have had skin cancer in the past, you are at risk for recurring skin cancer.   How to check your skin  Do your monthly skin checkups in front of a full-length mirror. Check all parts of your body, including your:   Head (ears, face, neck, and scalp)  Torso (front, back, and sides)  Arms (tops, undersides, upper, and lower armpits)  Hands (palms, backs, and fingers, including under the nails)  Buttocks and genitals  Legs (front, back, and sides)  Feet (tops, soles, toes, including under the nails, and between toes)  If you have a lot of moles, take digital photos of them each month. Make sure to take photos both up close and from a distance. These can help you see if any moles change over time.   Most skin changes are not cancer. But if you see any changes in your skin, call your doctor right away. Only he or she can diagnose a problem. If you have skin cancer, seeing your doctor can be the first step toward getting the treatment that could save your life.   [employer] last reviewed this educational content on 4/1/2019 2000-2020 [employer]. [address] [place] [address], [place] [address]. All rights reserved. This information is not intended as a substitute for professional medical care. Always follow your healthcare professional's instructions.       When should I call my doctor?  If you are worsening or not improving, please, contact us or seek urgent care as noted below.     Who should I call with questions (adults)?  [facility] (adult and pediatric): [phone]  [facility]  [place] (adult): [phone]  [facility] ([place], [place], [place] and [place]) [phone]  For urgent needs outside of business hours call the [facility] at [phone] and ask for the dermatology resident on call to be paged  If this is a medical emergency and you are unable to reach an ER, Call 911      If you need a prescription refill, please contact your pharmacy. Refills are approved or denied by our Physicians during normal business hours, Monday through Fridays  Per office policy, refills will not be granted if you have not been seen within the past year (or sooner depending on your child's condition)

## 2024-03-12 NOTE — LETTER
3/12/2024         RE: Gertrude Dyson  [address] [address]        Dear Colleague,    Thank you for referring your patient, Gertrude Dyson, to the [facility] [facility]. Please see a copy of my visit note below.    [facility] Dermatology Note  Encounter Date: Mar 12, 2024  Office Visit     Reviewed patients past medical history and pertinent chart review prior to patients visit today.     Dermatology Problem List:  Hidradenitis suppurativa, glass stage 1.   Current treatment: doxycycline 50 mg well controlled,  40 mg Oracea was denied by insurance. Trial decreasing to 50 mg every other day and will discontinue if no flares.  Previous treatments: hibiclens wash and clinda lotion, isotretinoin at outside clinic (elevated triglycerides and dry eyes). Previously well controlled on minocycline 50 mg b.i.d., BP wash.   #.  History of eczema.   #.  Corn, right thumb, status post paring 02/23/2017.   #.  Melasma, face.  Sun protection and (hydrocortisone 1%, adapalene gel, and OTC 2% hydroquinone)   #. EIC L inframammary - monitor, offered surgical intervention but politely declined     ____________________________________________    Assessment & Plan:     #. Hidradenitis suppurativa, Glass stage 1.  Inframammary, groin and lower abdominal involvement previously. Not active on exam today and patient states no activity for the last 2 to 3 years  Decrease to doxycycline 50 mg every other day.  She will do this for at least 2 months and then if she would like to do a trial of going off of the doxycycline she can do this.  If needed she will increase back to 50 mg twice a day with a flare and 50 mg daily for maintenance.  Patient has not flared in 2+ years and has stopped topicals.  Patient declines any topical medications saying that these were not helpful in the past and does not want to try them again.    Discussed that when flareups occur, she could  consider seeking care at her local clinic in [place] or for potential injection of Kenalog 10 mg/mL into the site to reduce inflammation.  She requested that I copy this note to her primary in [place] for consideration.      #Dilated pore of Stuart, left anterior thigh.  Benign no treatment needed    Kendal Johnson CNP  Dermatology    _______________________________________    CC: RECHECK (6 month return Hidradenitis suppurativa/Check left thigh//)    HPI:  Ms. Gertrude Dyson is a(n) 58 year old female who presents today for follow-up  for hidradenitis. She has been on doxycycline 50 mg once daily for the past 1.5 years and higher dosage the year before. She is very happy as she has not broken out with any cysts in the last 2 years.  She has stopped her topical clindamycin and Hibiclens and is only taking that doxycycline 50 mg daily.  She is interested in continuing.  She states that she was broken out with cysts for about 1.5 years but ever since they stopped when she was on minocycline and now doxycycline she has not gotten any signs of an outbreak.  She wonders if she would still have breakouts if she stopped the medications.  She is not having any side effects from medication.    She is moving to [place] at the beginning of April and will seek further dermatology care after moving.    Patient is otherwise feeling well, without additional skin concerns.      Physical Exam:  SKIN: Focused examination of breasts, axilla, left thigh, and abdomen was performed.  -1 area of scar on the left breast with no active open or close comedones or inflammatory papules or pustules  -Left superior thigh open dilated pore with poor contents within  -No scarring or postinflammatory erythema on the breast or inframammary folds    - No other lesions of concern on areas examined.     Medications:  Current Outpatient Medications   Medication     atorvastatin (LIPITOR) 20 MG tablet     azelastine (OPTIVAR) 0.05 % ophthalmic  solution     cycloSPORINE (RESTASIS) 0.05 % ophthalmic emulsion     doxycycline monohydrate (MONODOX) 50 MG capsule     DULoxetine (CYMBALTA) 20 MG capsule     DULoxetine HCl (CYMBALTA PO)     fish oil-omega-3 fatty acids 1000 MG capsule     hydrochlorothiazide (HYDRODIURIL) 25 MG tablet     lisinopril (PRINIVIL,ZESTRIL) 40 MG tablet     Magnesium Oxide 250 MG TABS     METFORMIN HCL PO     methadone (DOLOPHINE) 10 MG tablet     metoprolol (LOPRESSOR) 100 MG tablet     modafinil (PROVIGIL) 200 MG tablet     Pantoprazole Sodium (PROTONIX PO)     pregabalin (LYRICA) 150 MG capsule     SUMAtriptan (IMITREX) 25 MG tablet     calcium carbonate (TUMS) 500 MG chewable tablet     FERROUS GLUCONATE PO     methadone (DOLOPHINE) 5 MG tablet     Pregabalin (LYRICA) 200 MG CAPS     SUMAtriptan (IMITREX) 50 MG tablet     No current facility-administered medications for this visit.      Past Medical History:   Patient Active Problem List   Diagnosis     Idiopathic small fiber sensory neuropathy     History reviewed. No pertinent past medical history.    CC Referred MD Navin  No address on file on close of this encounter.       Again, thank you for allowing me to participate in the care of your patient.        Sincerely,        MINH Lua CNP

## 2024-07-02 ENCOUNTER — TELEPHONE (OUTPATIENT)
Dept: DERMATOLOGY | Facility: CLINIC | Age: 59
End: 2024-07-02
Payer: COMMERCIAL

## 2024-07-02 NOTE — TELEPHONE ENCOUNTER
S/w pt and advised to continue taking the doxycycline as she has been until she can establish care with a new dermatologist in [place].  Pt states understanding.    Kayce HARPER RN  [facility] [place]  [phone]

## 2024-07-02 NOTE — TELEPHONE ENCOUNTER
[facility] Call Center    Phone Message    May a detailed message be left on voicemail: yes     Reason for Call: Patient is moving 7/17 to [place]  - patient was taking her pills every other day - has had no breakouts or issues - should she stop Doxycycline?  Please call [phone] Thank you    Action Taken: Other: EC SKIN    Travel Screening: Not Applicable     Date of Service:

## 2024-07-06 ENCOUNTER — HEALTH MAINTENANCE LETTER (OUTPATIENT)
Age: 59
End: 2024-07-06

## 2025-06-22 ENCOUNTER — HEALTH MAINTENANCE LETTER (OUTPATIENT)
Age: 60
End: 2025-06-22

## 2025-07-13 ENCOUNTER — HEALTH MAINTENANCE LETTER (OUTPATIENT)
Age: 60
End: 2025-07-13